# Patient Record
Sex: FEMALE | Race: WHITE | NOT HISPANIC OR LATINO | Employment: OTHER | URBAN - METROPOLITAN AREA
[De-identification: names, ages, dates, MRNs, and addresses within clinical notes are randomized per-mention and may not be internally consistent; named-entity substitution may affect disease eponyms.]

---

## 2023-03-23 ENCOUNTER — TELEPHONE (OUTPATIENT)
Dept: PRIMARY CARE | Facility: CLINIC | Age: 83
End: 2023-03-23
Payer: COMMERCIAL

## 2023-03-23 DIAGNOSIS — Z12.12 SCREENING FOR COLORECTAL CANCER: ICD-10-CM

## 2023-03-23 DIAGNOSIS — K59.00 CONSTIPATION, UNSPECIFIED CONSTIPATION TYPE: ICD-10-CM

## 2023-03-23 DIAGNOSIS — Z12.11 SCREENING FOR COLORECTAL CANCER: ICD-10-CM

## 2023-03-23 NOTE — TELEPHONE ENCOUNTER
I spoke to pt. She has been having constipation and darker stools for over a month. Denies any blood, fever or other symptoms and would like a referral to GI so she can get CRC screening and possibly discuss problems. I gave her there referral number to call and make an apt to see GI. Referral sent in chart per protocol per VO Dr. Nunez.

## 2023-03-23 NOTE — TELEPHONE ENCOUNTER
Patient has not had a colonoscopy in 9 years.   She is having trouble with think bowels and dark bowels.   Patient wants to know if she should see Dr. Nunez first?  Please advise.

## 2023-03-27 LAB
ERYTHROCYTE DISTRIBUTION WIDTH (RATIO) BY AUTOMATED COUNT: 13.5 % (ref 11.5–14.5)
ERYTHROCYTE MEAN CORPUSCULAR HEMOGLOBIN CONCENTRATION (G/DL) BY AUTOMATED: 32.8 G/DL (ref 32–36)
ERYTHROCYTE MEAN CORPUSCULAR VOLUME (FL) BY AUTOMATED COUNT: 96 FL (ref 80–100)
ERYTHROCYTES (10*6/UL) IN BLOOD BY AUTOMATED COUNT: 4.2 X10E12/L (ref 4–5.2)
HEMATOCRIT (%) IN BLOOD BY AUTOMATED COUNT: 40.5 % (ref 36–46)
HEMOGLOBIN (G/DL) IN BLOOD: 13.3 G/DL (ref 12–16)
LEUKOCYTES (10*3/UL) IN BLOOD BY AUTOMATED COUNT: 5.2 X10E9/L (ref 4.4–11.3)
PLATELETS (10*3/UL) IN BLOOD AUTOMATED COUNT: 257 X10E9/L (ref 150–450)

## 2023-03-28 LAB
C. DIFFICILE TOXIN, PCR: NORMAL
CLOSTRIDIUM DIFFICILE NAP 1 STRAIN (PRESUMPTIVE): NORMAL

## 2023-03-29 LAB
CAMPYLOBACTER GP: NOT DETECTED
NOROVIRUS GI/GII: NOT DETECTED
ROTAVIRUS A: NOT DETECTED
SALMONELLA SP.: NOT DETECTED
SHIGA TOXIN 1: NOT DETECTED
SHIGA TOXIN 2: NOT DETECTED
SHIGELLA SP.: NOT DETECTED
VIBRIO GRP.: NOT DETECTED
YERSINIA ENTEROCOLITICA: NOT DETECTED

## 2023-05-05 ENCOUNTER — HOSPITAL ENCOUNTER (OUTPATIENT)
Dept: DATA CONVERSION | Facility: HOSPITAL | Age: 83
End: 2023-05-05
Attending: STUDENT IN AN ORGANIZED HEALTH CARE EDUCATION/TRAINING PROGRAM | Admitting: STUDENT IN AN ORGANIZED HEALTH CARE EDUCATION/TRAINING PROGRAM
Payer: COMMERCIAL

## 2023-05-05 DIAGNOSIS — K31.9 DISEASE OF STOMACH AND DUODENUM, UNSPECIFIED: ICD-10-CM

## 2023-05-05 DIAGNOSIS — R19.7 DIARRHEA, UNSPECIFIED: ICD-10-CM

## 2023-05-05 DIAGNOSIS — R10.9 UNSPECIFIED ABDOMINAL PAIN: ICD-10-CM

## 2023-05-05 DIAGNOSIS — D12.4 BENIGN NEOPLASM OF DESCENDING COLON: ICD-10-CM

## 2023-05-05 DIAGNOSIS — R68.81 EARLY SATIETY: ICD-10-CM

## 2023-05-05 DIAGNOSIS — Z90.49 ACQUIRED ABSENCE OF OTHER SPECIFIED PARTS OF DIGESTIVE TRACT: ICD-10-CM

## 2023-05-05 DIAGNOSIS — K44.9 DIAPHRAGMATIC HERNIA WITHOUT OBSTRUCTION OR GANGRENE: ICD-10-CM

## 2023-05-05 LAB
ATRIAL RATE: 72 BPM
P AXIS: 44 DEGREES
P OFFSET: 202 MS
P ONSET: 154 MS
PR INTERVAL: 134 MS
Q ONSET: 221 MS
QRS COUNT: 12 BEATS
QRS DURATION: 80 MS
QT INTERVAL: 350 MS
QTC CALCULATION(BAZETT): 383 MS
QTC FREDERICIA: 372 MS
R AXIS: 20 DEGREES
T AXIS: 45 DEGREES
T OFFSET: 396 MS
VENTRICULAR RATE: 72 BPM

## 2023-05-09 LAB
COMPLETE PATHOLOGY REPORT: NORMAL
CONVERTED CLINICAL DIAGNOSIS-HISTORY: NORMAL
CONVERTED DIAGNOSIS COMMENT: NORMAL
CONVERTED FINAL DIAGNOSIS: NORMAL
CONVERTED FINAL REPORT PDF LINK TO COPY AND PASTE: NORMAL
CONVERTED SPECIMEN DESCRIPTION: NORMAL
FUNGAL CULTURE/SMEAR: NORMAL
FUNGAL SMEAR: NORMAL

## 2023-05-12 LAB
COMPLETE PATHOLOGY REPORT: NORMAL
CONVERTED CLINICAL DIAGNOSIS-HISTORY: NORMAL
CONVERTED FINAL DIAGNOSIS: NORMAL
CONVERTED FINAL REPORT PDF LINK TO COPY AND PASTE: NORMAL
CONVERTED GROSS DESCRIPTION: NORMAL

## 2023-06-12 PROBLEM — E55.9 VITAMIN D INSUFFICIENCY: Status: ACTIVE | Noted: 2023-06-12

## 2023-06-12 PROBLEM — E78.00 ELEVATED LDL CHOLESTEROL LEVEL: Status: ACTIVE | Noted: 2023-06-12

## 2023-06-20 ENCOUNTER — OFFICE VISIT (OUTPATIENT)
Dept: PRIMARY CARE | Facility: CLINIC | Age: 83
End: 2023-06-20
Payer: MEDICARE

## 2023-06-20 VITALS
RESPIRATION RATE: 18 BRPM | HEART RATE: 72 BPM | SYSTOLIC BLOOD PRESSURE: 124 MMHG | HEIGHT: 63 IN | WEIGHT: 118 LBS | DIASTOLIC BLOOD PRESSURE: 74 MMHG | BODY MASS INDEX: 20.91 KG/M2

## 2023-06-20 DIAGNOSIS — E03.9 HYPOTHYROIDISM, UNSPECIFIED TYPE: ICD-10-CM

## 2023-06-20 DIAGNOSIS — R47.89 WORD FINDING DIFFICULTY: ICD-10-CM

## 2023-06-20 DIAGNOSIS — F99 ABNORMAL MMSE: ICD-10-CM

## 2023-06-20 DIAGNOSIS — Z78.9 FULL CODE STATUS: ICD-10-CM

## 2023-06-20 DIAGNOSIS — Z00.00 ENCOUNTER FOR SUBSEQUENT ANNUAL WELLNESS VISIT (AWV) IN MEDICARE PATIENT: Primary | ICD-10-CM

## 2023-06-20 DIAGNOSIS — M81.6 LOCALIZED OSTEOPOROSIS WITHOUT PATHOLOGICAL FRACTURE: ICD-10-CM

## 2023-06-20 DIAGNOSIS — E78.00 ELEVATED LDL CHOLESTEROL LEVEL: ICD-10-CM

## 2023-06-20 DIAGNOSIS — Z12.31 VISIT FOR SCREENING MAMMOGRAM: ICD-10-CM

## 2023-06-20 DIAGNOSIS — Z51.81 MEDICATION MONITORING ENCOUNTER: ICD-10-CM

## 2023-06-20 DIAGNOSIS — N28.1 RENAL CYST: ICD-10-CM

## 2023-06-20 DIAGNOSIS — Z00.00 ENCOUNTER FOR ANNUAL PHYSICAL EXAM: ICD-10-CM

## 2023-06-20 DIAGNOSIS — E55.9 VITAMIN D DEFICIENCY: ICD-10-CM

## 2023-06-20 DIAGNOSIS — I65.29 CAROTID ATHEROSCLEROSIS, UNSPECIFIED LATERALITY: ICD-10-CM

## 2023-06-20 DIAGNOSIS — Z13.89 ENCOUNTER FOR SCREENING FOR OTHER DISORDER: ICD-10-CM

## 2023-06-20 DIAGNOSIS — Z78.0 MENOPAUSE: ICD-10-CM

## 2023-06-20 PROBLEM — R03.0 WHITE COAT SYNDROME WITHOUT HYPERTENSION: Status: RESOLVED | Noted: 2023-06-20 | Resolved: 2023-06-20

## 2023-06-20 PROBLEM — R19.4 BOWEL HABIT CHANGES: Status: RESOLVED | Noted: 2023-06-20 | Resolved: 2023-06-20

## 2023-06-20 PROBLEM — R31.29 MICROSCOPIC HEMATURIA: Status: RESOLVED | Noted: 2023-06-20 | Resolved: 2023-06-20

## 2023-06-20 PROBLEM — L98.9 SKIN LESION OF FACE: Status: RESOLVED | Noted: 2023-06-20 | Resolved: 2023-06-20

## 2023-06-20 PROBLEM — N13.30 HYDRONEPHROSIS, LEFT: Status: ACTIVE | Noted: 2023-06-20

## 2023-06-20 PROBLEM — B37.81 CANDIDA ESOPHAGITIS (MULTI): Status: RESOLVED | Noted: 2023-06-20 | Resolved: 2023-06-20

## 2023-06-20 PROBLEM — R68.81 EARLY SATIETY: Status: RESOLVED | Noted: 2023-06-20 | Resolved: 2023-06-20

## 2023-06-20 PROBLEM — R19.5 DARK STOOLS: Status: RESOLVED | Noted: 2023-06-20 | Resolved: 2023-06-20

## 2023-06-20 PROBLEM — R10.13 EPIGASTRIC PAIN: Status: RESOLVED | Noted: 2023-06-20 | Resolved: 2023-06-20

## 2023-06-20 PROBLEM — D72.819 LEUKOPENIA: Status: RESOLVED | Noted: 2023-06-20 | Resolved: 2023-06-20

## 2023-06-20 PROBLEM — M62.838 TRAPEZIUS MUSCLE SPASM: Status: RESOLVED | Noted: 2023-06-20 | Resolved: 2023-06-20

## 2023-06-20 PROBLEM — M85.852 OSTEOPENIA OF LEFT HIP: Status: ACTIVE | Noted: 2023-06-20

## 2023-06-20 PROBLEM — N60.01 CYST OF RIGHT BREAST: Status: RESOLVED | Noted: 2023-06-20 | Resolved: 2023-06-20

## 2023-06-20 PROCEDURE — 1123F ACP DISCUSS/DSCN MKR DOCD: CPT | Performed by: INTERNAL MEDICINE

## 2023-06-20 PROCEDURE — G0439 PPPS, SUBSEQ VISIT: HCPCS | Performed by: INTERNAL MEDICINE

## 2023-06-20 PROCEDURE — 1170F FXNL STATUS ASSESSED: CPT | Performed by: INTERNAL MEDICINE

## 2023-06-20 PROCEDURE — 1159F MED LIST DOCD IN RCRD: CPT | Performed by: INTERNAL MEDICINE

## 2023-06-20 PROCEDURE — 1160F RVW MEDS BY RX/DR IN RCRD: CPT | Performed by: INTERNAL MEDICINE

## 2023-06-20 PROCEDURE — 99214 OFFICE O/P EST MOD 30 MIN: CPT | Performed by: INTERNAL MEDICINE

## 2023-06-20 PROCEDURE — G0444 DEPRESSION SCREEN ANNUAL: HCPCS | Performed by: INTERNAL MEDICINE

## 2023-06-20 PROCEDURE — 1036F TOBACCO NON-USER: CPT | Performed by: INTERNAL MEDICINE

## 2023-06-20 RX ORDER — ASCORBIC ACID 500 MG
1 TABLET ORAL DAILY
COMMUNITY
Start: 2018-04-09

## 2023-06-20 RX ORDER — ACETAMINOPHEN 500 MG
50 TABLET ORAL DAILY
COMMUNITY
Start: 2022-01-12

## 2023-06-20 RX ORDER — CALCIUM CITRATE/VITAMIN D3 200MG-6.25
TABLET ORAL
COMMUNITY
Start: 2018-04-09

## 2023-06-20 RX ORDER — LANOLIN ALCOHOL/MO/W.PET/CERES
1 CREAM (GRAM) TOPICAL DAILY
COMMUNITY
Start: 2018-04-09

## 2023-06-20 SDOH — ECONOMIC STABILITY: FOOD INSECURITY: WITHIN THE PAST 12 MONTHS, THE FOOD YOU BOUGHT JUST DIDN'T LAST AND YOU DIDN'T HAVE MONEY TO GET MORE.: NEVER TRUE

## 2023-06-20 SDOH — ECONOMIC STABILITY: HOUSING INSECURITY: IN THE LAST 12 MONTHS, HOW MANY PLACES HAVE YOU LIVED?: 1

## 2023-06-20 SDOH — ECONOMIC STABILITY: INCOME INSECURITY: IN THE LAST 12 MONTHS, WAS THERE A TIME WHEN YOU WERE NOT ABLE TO PAY THE MORTGAGE OR RENT ON TIME?: NO

## 2023-06-20 SDOH — ECONOMIC STABILITY: HOUSING INSECURITY
IN THE LAST 12 MONTHS, WAS THERE A TIME WHEN YOU DID NOT HAVE A STEADY PLACE TO SLEEP OR SLEPT IN A SHELTER (INCLUDING NOW)?: NO

## 2023-06-20 SDOH — HEALTH STABILITY: PHYSICAL HEALTH: ON AVERAGE, HOW MANY MINUTES DO YOU ENGAGE IN EXERCISE AT THIS LEVEL?: 60 MIN

## 2023-06-20 SDOH — ECONOMIC STABILITY: FOOD INSECURITY: WITHIN THE PAST 12 MONTHS, YOU WORRIED THAT YOUR FOOD WOULD RUN OUT BEFORE YOU GOT MONEY TO BUY MORE.: NEVER TRUE

## 2023-06-20 SDOH — HEALTH STABILITY: PHYSICAL HEALTH: ON AVERAGE, HOW MANY DAYS PER WEEK DO YOU ENGAGE IN MODERATE TO STRENUOUS EXERCISE (LIKE A BRISK WALK)?: 7 DAYS

## 2023-06-20 SDOH — ECONOMIC STABILITY: TRANSPORTATION INSECURITY
IN THE PAST 12 MONTHS, HAS LACK OF TRANSPORTATION KEPT YOU FROM MEETINGS, WORK, OR FROM GETTING THINGS NEEDED FOR DAILY LIVING?: NO

## 2023-06-20 SDOH — ECONOMIC STABILITY: TRANSPORTATION INSECURITY
IN THE PAST 12 MONTHS, HAS THE LACK OF TRANSPORTATION KEPT YOU FROM MEDICAL APPOINTMENTS OR FROM GETTING MEDICATIONS?: NO

## 2023-06-20 ASSESSMENT — SOCIAL DETERMINANTS OF HEALTH (SDOH)
HOW OFTEN DO YOU ATTEND CHURCH OR RELIGIOUS SERVICES?: NEVER
HOW OFTEN DO YOU GET TOGETHER WITH FRIENDS OR RELATIVES?: MORE THAN THREE TIMES A WEEK
WITHIN THE LAST YEAR, HAVE YOU BEEN AFRAID OF YOUR PARTNER OR EX-PARTNER?: NO
WITHIN THE LAST YEAR, HAVE TO BEEN RAPED OR FORCED TO HAVE ANY KIND OF SEXUAL ACTIVITY BY YOUR PARTNER OR EX-PARTNER?: NO
WITHIN THE LAST YEAR, HAVE YOU BEEN KICKED, HIT, SLAPPED, OR OTHERWISE PHYSICALLY HURT BY YOUR PARTNER OR EX-PARTNER?: NO
IN A TYPICAL WEEK, HOW MANY TIMES DO YOU TALK ON THE PHONE WITH FAMILY, FRIENDS, OR NEIGHBORS?: MORE THAN THREE TIMES A WEEK
IN THE PAST 12 MONTHS, HAS THE ELECTRIC, GAS, OIL, OR WATER COMPANY THREATENED TO SHUT OFF SERVICE IN YOUR HOME?: NO
HOW HARD IS IT FOR YOU TO PAY FOR THE VERY BASICS LIKE FOOD, HOUSING, MEDICAL CARE, AND HEATING?: NOT HARD AT ALL
DO YOU BELONG TO ANY CLUBS OR ORGANIZATIONS SUCH AS CHURCH GROUPS UNIONS, FRATERNAL OR ATHLETIC GROUPS, OR SCHOOL GROUPS?: NO
WITHIN THE LAST YEAR, HAVE YOU BEEN HUMILIATED OR EMOTIONALLY ABUSED IN OTHER WAYS BY YOUR PARTNER OR EX-PARTNER?: NO
HOW OFTEN DO YOU ATTENT MEETINGS OF THE CLUB OR ORGANIZATION YOU BELONG TO?: NEVER

## 2023-06-20 ASSESSMENT — ANXIETY QUESTIONNAIRES
4. TROUBLE RELAXING: NOT AT ALL
GAD7 TOTAL SCORE: 0
IF YOU CHECKED OFF ANY PROBLEMS ON THIS QUESTIONNAIRE, HOW DIFFICULT HAVE THESE PROBLEMS MADE IT FOR YOU TO DO YOUR WORK, TAKE CARE OF THINGS AT HOME, OR GET ALONG WITH OTHER PEOPLE: NOT DIFFICULT AT ALL
1. FEELING NERVOUS, ANXIOUS, OR ON EDGE: NOT AT ALL
3. WORRYING TOO MUCH ABOUT DIFFERENT THINGS: NOT AT ALL
6. BECOMING EASILY ANNOYED OR IRRITABLE: NOT AT ALL
2. NOT BEING ABLE TO STOP OR CONTROL WORRYING: NOT AT ALL
7. FEELING AFRAID AS IF SOMETHING AWFUL MIGHT HAPPEN: NOT AT ALL
5. BEING SO RESTLESS THAT IT IS HARD TO SIT STILL: NOT AT ALL

## 2023-06-20 ASSESSMENT — PATIENT HEALTH QUESTIONNAIRE - PHQ9
SUM OF ALL RESPONSES TO PHQ9 QUESTIONS 1 AND 2: 0
1. LITTLE INTEREST OR PLEASURE IN DOING THINGS: NOT AT ALL
2. FEELING DOWN, DEPRESSED OR HOPELESS: NOT AT ALL
2. FEELING DOWN, DEPRESSED OR HOPELESS: NOT AT ALL
SUM OF ALL RESPONSES TO PHQ9 QUESTIONS 1 AND 2: 0
2. FEELING DOWN, DEPRESSED OR HOPELESS: NOT AT ALL
1. LITTLE INTEREST OR PLEASURE IN DOING THINGS: NOT AT ALL
1. LITTLE INTEREST OR PLEASURE IN DOING THINGS: NOT AT ALL
SUM OF ALL RESPONSES TO PHQ9 QUESTIONS 1 & 2: 0

## 2023-06-20 ASSESSMENT — ACTIVITIES OF DAILY LIVING (ADL)
JUDGMENT_ADEQUATE_SAFELY_COMPLETE_DAILY_ACTIVITIES: YES
GROCERY_SHOPPING: INDEPENDENT
WALKS IN HOME: INDEPENDENT
STIL DRIVING: YES
TAKING_MEDICATION: INDEPENDENT
MANAGING_FINANCES: INDEPENDENT
PILL BOX USED: NO
ADEQUATE_TO_COMPLETE_ADL: YES
NEEDS ASSISTANCE WITH FOOD: INDEPENDENT
USING TRANSPORTATION: INDEPENDENT
HEARING - RIGHT EAR: HEARING AID
GROOMING: INDEPENDENT
EATING: INDEPENDENT
DRESSING YOURSELF: INDEPENDENT
BATHING: INDEPENDENT
HEARING - LEFT EAR: HEARING AID
DOING_HOUSEWORK: INDEPENDENT
TAKING MEDICATION: INDEPENDENT
PATIENT'S MEMORY ADEQUATE TO SAFELY COMPLETE DAILY ACTIVITIES?: YES
TOILETING: INDEPENDENT
MANAGING FINANCES: INDEPENDENT
FEEDING YOURSELF: INDEPENDENT
BATHING: INDEPENDENT
DRESSING: INDEPENDENT

## 2023-06-20 ASSESSMENT — PAIN SCALES - GENERAL: PAINLEVEL: 0-NO PAIN

## 2023-06-20 ASSESSMENT — LIFESTYLE VARIABLES
SKIP TO QUESTIONS 9-10: 1
AUDIT-C TOTAL SCORE: 0
HOW OFTEN DO YOU HAVE SIX OR MORE DRINKS ON ONE OCCASION: NEVER
HOW OFTEN DO YOU HAVE A DRINK CONTAINING ALCOHOL: NEVER
HOW MANY STANDARD DRINKS CONTAINING ALCOHOL DO YOU HAVE ON A TYPICAL DAY: PATIENT DOES NOT DRINK

## 2023-06-20 ASSESSMENT — COLUMBIA-SUICIDE SEVERITY RATING SCALE - C-SSRS
1. IN THE PAST MONTH, HAVE YOU WISHED YOU WERE DEAD OR WISHED YOU COULD GO TO SLEEP AND NOT WAKE UP?: NO
6. HAVE YOU EVER DONE ANYTHING, STARTED TO DO ANYTHING, OR PREPARED TO DO ANYTHING TO END YOUR LIFE?: NO
2. HAVE YOU ACTUALLY HAD ANY THOUGHTS OF KILLING YOURSELF?: NO

## 2023-06-20 ASSESSMENT — ENCOUNTER SYMPTOMS
LOSS OF SENSATION IN FEET: 0
DEPRESSION: 0
OCCASIONAL FEELINGS OF UNSTEADINESS: 0

## 2023-06-20 NOTE — PROGRESS NOTES
"Subjective   Reason for Visit: Ange Delaney is an 83 y.o. female here for a Medicare Wellness visit.  And general follow up.    Past Medical, Surgical, and Family History reviewed and updated in chart.    HPI  Here for annual (GA) and wellness visit.  No physical symptoms   Had renal ul with 6cm cyst in past and microhematuria and left hydropnephrosis in 2020, will harvey ultrasound /s  Had candida esophagitis spring, on egd and treated -had dysphagia pre and now that is gone, \"it was from eating chocolate\"  Treated 10 days of oral fluconazole. Had video visit to review finding with dr dedrick deng  Colonoscopy may 2023 tubular adenoma    Full code    She is sometimes not able to find words x 6 onths  No other memory issues  Dtr noted it too  No anxiety or depresson, word eventually gets there.  No fmh neurologic issue  She talks to people during the week  She does card games on the computer  She likes puzzles  Busy with grandkids  Not a lot of word games  No focal weakness speech issues or head trauma.    Health maintenance items last completed:  Colonoscopy: may 2023  Mammogram: 6/29/22 d/ her usptf rec , she is healthy , recommend she continue mammograms. Ordered. She agrees.  Bone Density: , ordered  Urine albumin if HTN or DM2:   Breast exam in office:  Vaccines due or not completed: consider omicron containing covid booster, her last was 4-2022.    Discussed current recommendations for covid vaccines, cases are currently low but not non-existent.        Last Health Maintenance exam:      Patient Care Team:  Jadyn Nunez MD as PCP - General  Jadyn Nunez MD as PCP - [facility] Attributed Provider     Review of Systems  All systems reviewed and are negative unless otherwise stated in HPI.   Review of systems, written document, scanned in to office visit.  I reviewed 7 page history and review of systems form.    Objective   Vitals:  /74 (BP Location: Left arm, Patient Position: " "Sitting, BP Cuff Size: Adult)   Pulse 72   Resp 18   Ht 1.6 m (5' 3\")   Wt 53.5 kg (118 lb)   BMI 20.90 kg/m²       Physical Exam  General: Patient is known to me, looks well, is in usual state of health, with  no obvious distress.  Head: normocephalic  Eyes: PERRL, EOMI, no scleral icterus or conjunctival abnormality  Ears:Normal canals and TM's  Oropharynx: Well hydrated mucosa. no lesions, erythema. palate moves well.  Neck: No masses, no cervical (A/P/ or Lateral) adenopathy. Thyroid not enlarged and no nodules. Carotid pulses normal and no bruits.  Chest: Normal AP diameter. No supraclavicular adenopathy.  Lungs: Clear to auscultation: no rales , wheezes, rhonchi, rubs, examined bilaterally, ant/post /lateral. RR normal.  Heart: RRR. No MGCR S1 S2 normal.  Abd: BS normal, no bruits. No hepatosplenomegaly. No abdominal mass or tenderness. No distension.  Extremities: No upper extremity edema. No lower leg edema.No ischemia.   Joints: no synovitis seen. No deformities.  Pulses: normal posterior tibialis pulses, normal dorsalis pedis pulses. normal radial pulses. Normal femoral pulses without bruits.  Neuro: CN 2-12 intact . Alert, oriented, appropriate.  No focal weakness observed. No tremors. Ambulation is normal .  Psych: Mood and affect normal. No cognitive deficits noted during this exam. Alert, oriented, appropriate.  Skin: No rash, petechiae or excessive bruising.  Lymph: no SC axillary or inguinal adenopathy     Breast Exam : Symmetric appearance. No masses, nipple discharge, skin retraction, axillary or supraclavicular adenopathy.  MMSE 28/30- missed the backwords spelling of world 2 pts--did not want to try the math.    Assessment/Plan   Problem List Items Addressed This Visit       Elevated LDL cholesterol level    Relevant Orders    Lipid Panel    TSH with reflex to Free T4 if abnormal    Comprehensive Metabolic Panel    CT head wo IV contrast    Carotid atherosclerosis    Relevant Orders    CT " head wo IV contrast    Localized osteoporosis without pathological fracture    Relevant Orders    XR DEXA bone density    Renal cyst    Relevant Orders    Urinalysis with Reflex Microscopic    US renal complete     Other Visit Diagnoses       Encounter for subsequent annual wellness visit (AWV) in Medicare patient    -  Primary    Vitamin D deficiency        Relevant Orders    Vitamin D, Total    Hypothyroidism, unspecified type        Relevant Orders    Vitamin B12    Vitamin B1, Whole Blood    Medication monitoring encounter        Encounter for screening for other disorder        Full code status        Visit for screening mammogram        Relevant Orders    BI mammo bilateral screening tomosynthesis    Menopause        Relevant Orders    XR DEXA bone density    Word finding difficulty        Relevant Orders    CT head wo IV contrast    Vitamin B12    Vitamin B1, Whole Blood    Encounter for annual physical exam            Cbw ordered as well as above.  Medicare wellness visit  completed including:  Immunizations,   Health Maintenance items,  Discussion of advanced directives and code status, which is: full code  Fall risk and prevention addressed.  Functionality and pain were assessed.   Cancer screening testing was addressed as appropriate-see patient discussion/orders.   Medications were discussed and reviewed/reconciled and refill need assessed/handled.   Patient states taking their medication regularly and appropriately.  Also:   Depression screening PhQ-2 completed: neg  Alcohol misuse screen:neg    In addition to the wellness visit and screening, the above medical issues were addressed:  As well as Results of testing completed since last visit.

## 2023-06-20 NOTE — PATIENT INSTRUCTIONS
Thank you for coming in for your annual check up and wellness visit.  Please obtain fasting blood tests and a urine test at the lab.   Instructions for fasting blood work.   Please do not consume calories for 10-12 hours prior to this blood test. It is ok to drink water, you should be hydrated.  Please do not take vitamins, supplements or thyroid medication before your blood is drawn this day.   Most lab  test results should be available for your review on the  My Chart portal within 48 hours.    Please schedule your mammogram for after June 29th,  Schedule ultrasound of the kidneys.  Schedule CT brain no contrast due to word finding issues.    End of October : bone density is due.  Vaccines:   Recommend covid vaccine this summer as your last booster was over a year ago: April 2022.    Flu shot in the fall.    Add games or exercises that involve words: crossword puzzles, word search and want you to write these, not on the computer. Also doing basic arithmetic is good for memory.    Follow up November after bone density and check on word finding issue.    Annual in one year.  Follow up one year annual, sooner depends on results of tests or any new issues.

## 2023-08-02 DIAGNOSIS — N28.1 RENAL CYST: ICD-10-CM

## 2023-08-02 DIAGNOSIS — N13.30 HYDRONEPHROSIS, LEFT: Primary | ICD-10-CM

## 2023-08-03 NOTE — RESULT ENCOUNTER NOTE
Please call the patient regarding her result.  Refer urology re hydronephrosis mild. And renal cysts Seen on ultrasound.Refer to Dr Monzon or if will not leave [place], dr cota. Let me know.    Also head ct-shows some hardening of the arteries, a little volume  loss. Age related findings

## 2023-08-04 LAB — URINE CULTURE: NO GROWTH

## 2023-08-12 DIAGNOSIS — Z12.31 ENCOUNTER FOR SCREENING MAMMOGRAM FOR MALIGNANT NEOPLASM OF BREAST: Primary | ICD-10-CM

## 2023-08-12 NOTE — RESULT ENCOUNTER NOTE
Please call the patient regarding her mammogram result.  They did not see anything bad on her mammogram but her positioning was not right for one of the views and she needs to go back to get another image. No charge to her and they said to say sorry about this, but they want the best pictures to be able to truly say the mammogram was ok. The image needed is on the right.

## 2023-09-07 VITALS
HEART RATE: 70 BPM | DIASTOLIC BLOOD PRESSURE: 70 MMHG | SYSTOLIC BLOOD PRESSURE: 153 MMHG | TEMPERATURE: 97.2 F | RESPIRATION RATE: 18 BRPM

## 2023-09-11 ENCOUNTER — LAB (OUTPATIENT)
Dept: LAB | Facility: LAB | Age: 83
End: 2023-09-11
Payer: MEDICARE

## 2023-09-11 DIAGNOSIS — E03.9 HYPOTHYROIDISM, UNSPECIFIED TYPE: ICD-10-CM

## 2023-09-11 DIAGNOSIS — E55.9 VITAMIN D DEFICIENCY: ICD-10-CM

## 2023-09-11 DIAGNOSIS — E78.00 ELEVATED LDL CHOLESTEROL LEVEL: ICD-10-CM

## 2023-09-11 DIAGNOSIS — N28.1 RENAL CYST: ICD-10-CM

## 2023-09-11 DIAGNOSIS — R47.89 WORD FINDING DIFFICULTY: ICD-10-CM

## 2023-09-11 LAB
ALANINE AMINOTRANSFERASE (SGPT) (U/L) IN SER/PLAS: 9 U/L (ref 7–45)
ALBUMIN (G/DL) IN SER/PLAS: 4.3 G/DL (ref 3.4–5)
ALKALINE PHOSPHATASE (U/L) IN SER/PLAS: 43 U/L (ref 33–136)
ANION GAP IN SER/PLAS: 15 MMOL/L (ref 10–20)
APPEARANCE, URINE: NORMAL
ASPARTATE AMINOTRANSFERASE (SGOT) (U/L) IN SER/PLAS: 19 U/L (ref 9–39)
BILIRUBIN TOTAL (MG/DL) IN SER/PLAS: 0.8 MG/DL (ref 0–1.2)
BILIRUBIN, URINE: NEGATIVE
BLOOD, URINE: NEGATIVE
CALCIDIOL (25 OH VITAMIN D3) (NG/ML) IN SER/PLAS: 53 NG/ML
CALCIUM (MG/DL) IN SER/PLAS: 9.8 MG/DL (ref 8.6–10.6)
CARBON DIOXIDE, TOTAL (MMOL/L) IN SER/PLAS: 27 MMOL/L (ref 21–32)
CHLORIDE (MMOL/L) IN SER/PLAS: 102 MMOL/L (ref 98–107)
CHOLESTEROL (MG/DL) IN SER/PLAS: 227 MG/DL (ref 0–199)
CHOLESTEROL IN HDL (MG/DL) IN SER/PLAS: 60.2 MG/DL
CHOLESTEROL/HDL RATIO: 3.8
COBALAMIN (VITAMIN B12) (PG/ML) IN SER/PLAS: 332 PG/ML (ref 211–911)
COLOR, URINE: YELLOW
CREATININE (MG/DL) IN SER/PLAS: 0.82 MG/DL (ref 0.5–1.05)
GFR FEMALE: 71 ML/MIN/1.73M2
GLUCOSE (MG/DL) IN SER/PLAS: 103 MG/DL (ref 74–99)
GLUCOSE, URINE: NEGATIVE MG/DL
KETONES, URINE: NEGATIVE MG/DL
LDL: 144 MG/DL (ref 0–99)
LEUKOCYTE ESTERASE, URINE: NEGATIVE
NITRITE, URINE: NEGATIVE
PH, URINE: 6 (ref 5–8)
POTASSIUM (MMOL/L) IN SER/PLAS: 4.3 MMOL/L (ref 3.5–5.3)
PROTEIN TOTAL: 7.2 G/DL (ref 6.4–8.2)
PROTEIN, URINE: NEGATIVE MG/DL
SODIUM (MMOL/L) IN SER/PLAS: 140 MMOL/L (ref 136–145)
SPECIFIC GRAVITY, URINE: 1.02 (ref 1–1.03)
THYROTROPIN (MIU/L) IN SER/PLAS BY DETECTION LIMIT <= 0.05 MIU/L: 3.8 MIU/L (ref 0.44–3.98)
TRIGLYCERIDE (MG/DL) IN SER/PLAS: 116 MG/DL (ref 0–149)
UREA NITROGEN (MG/DL) IN SER/PLAS: 21 MG/DL (ref 6–23)
UROBILINOGEN, URINE: <2 MG/DL (ref 0–1.9)
VLDL: 23 MG/DL (ref 0–40)

## 2023-09-11 PROCEDURE — 36415 COLL VENOUS BLD VENIPUNCTURE: CPT

## 2023-09-11 PROCEDURE — 84425 ASSAY OF VITAMIN B-1: CPT

## 2023-09-11 PROCEDURE — 82306 VITAMIN D 25 HYDROXY: CPT

## 2023-09-11 PROCEDURE — 81003 URINALYSIS AUTO W/O SCOPE: CPT

## 2023-09-11 PROCEDURE — 82607 VITAMIN B-12: CPT

## 2023-09-11 PROCEDURE — 80061 LIPID PANEL: CPT

## 2023-09-11 PROCEDURE — 84443 ASSAY THYROID STIM HORMONE: CPT

## 2023-09-11 PROCEDURE — 80053 COMPREHEN METABOLIC PANEL: CPT

## 2023-09-11 NOTE — RESULT ENCOUNTER NOTE
Please call the patient regarding her result.  Her B12 is too low. Is she taking a B12 supplement?  If no, recommend 250 mcg one tab daily long term.  It is important to keep B12 levels over 400 for brain and cognitive function.  Can send it in as an rx if preferred.  Vitamin d is a little low at 29. Is she taking her supplement regularly? I is to be taking 2000 international units daily.,     Cholesterol mildly elevated but stable.Can discuss further at her November visit

## 2023-09-14 NOTE — H&P
History of Present Illness:   History Present Illness:  Reason for surgery: Abdominal pain, early satiety,  diarrhea, dark stools.   HPI:    Abdominal pain, early satiety, diarrhea, dark stools --> EGD and colonoscopy.     Allergies:        Allergies:  ·  doxycycline : Unknown  ·  Fosamax : Unknown  ·  Vytorin : Unknown    Home Medication Review:   Home Medications Reviewed: yes     Impression/Procedure:   ·  Impression and Planned Procedure: Abdominal pain, early satiety, diarrhea, dark stools --> EGD and colonoscopy.       ERAS (Enhanced Recovery After Surgery):  ·  ERAS Patient: no       Vital Signs:  Temperature C: 36.2 degrees C   Temperature F: 97.1 degrees F   Heart Rate: 70 beats per minute   Respiratory Rate: 18 breath per minute   Blood Pressure Systolic: 153 mm/Hg   Blood Pressure Diastolic: 70 mm/Hg     Physical Exam by System:    Constitutional: A&Ox3. NAD   Head/Neck: AT/NC   Respiratory/Thorax: CTA bilat. No wheezing.   Cardiovascular: RRR. No murmur.   Gastrointestinal: Soft, NT/ND.   Extremities: No edema.   Neurological: No focal deficits   Psychological: Normal mood and affect.     Consent:   COVID-19 Consent:  ·  COVID-19 Risk Consent Surgeon has reviewed key risks related to the risk of emily COVID-19 and if they contract COVID-19 what the risks are.       Electronic Signatures:  Janak Pascual)  (Signed 05-May-2023 10:21)   Authored: History of Present Illness, Allergies, Home  Medication Review, Impression/Procedure, ERAS, Physical Exam, Consent, Note Completion      Last Updated: 05-May-2023 10:21 by Janak Pascual ()

## 2023-09-15 LAB — VITAMIN B1, WHOLE BLOOD: 122 NMOL/L (ref 70–180)

## 2023-09-26 ENCOUNTER — TELEPHONE (OUTPATIENT)
Dept: PRIMARY CARE | Facility: CLINIC | Age: 83
End: 2023-09-26
Payer: COMMERCIAL

## 2023-09-26 NOTE — TELEPHONE ENCOUNTER
Pts daughter called and is checking on [facility] papers for her mom. She is having cataract surgery next month and she will need to take her to these appts. Her mom is no longer to go to appts by herself.

## 2023-09-27 NOTE — TELEPHONE ENCOUNTER
Pt daughter Gardenia called to check on status of forms for FMLA. She is aware [employer] has forms and will fill them out and we will contact her when they are completed.

## 2023-10-19 ENCOUNTER — TELEPHONE (OUTPATIENT)
Dept: UROLOGY | Facility: HOSPITAL | Age: 83
End: 2023-10-19

## 2023-10-30 ENCOUNTER — APPOINTMENT (OUTPATIENT)
Dept: RADIOLOGY | Facility: CLINIC | Age: 83
End: 2023-10-30
Payer: MEDICARE

## 2023-11-04 NOTE — PROGRESS NOTES
Subjective   Patient ID: Ange Delaney is a 83 y.o. female who presents for Follow-up (Pt here for follow up and review. Daughter did not come with her today, she only comes when pt is driving somewhere she doesn't know or having trouble with direction. Pt completed the MMSE with this nurse today.).  LAST VISIT PLAN 6/20/23  Problem List Items Addressed This Visit         Elevated LDL cholesterol level     Relevant Orders     Lipid Panel     TSH with reflex to Free T4 if abnormal     Comprehensive Metabolic Panel     CT head wo IV contrast     Carotid atherosclerosis     Relevant Orders     CT head wo IV contrast     Localized osteoporosis without pathological fracture     Relevant Orders     XR DEXA bone density     Renal cyst     Relevant Orders     Urinalysis with Reflex Microscopic     US renal complete      Other Visit Diagnoses         Encounter for subsequent annual wellness visit (AWV) in Medicare patient    -  Primary     Vitamin D deficiency         Relevant Orders     Vitamin D, Total     Hypothyroidism, unspecified type         Relevant Orders     Vitamin B12     Vitamin B1, Whole Blood     Medication monitoring encounter         Encounter for screening for other disorder         Full code status         Visit for screening mammogram         Relevant Orders     BI mammo bilateral screening tomosynthesis     Menopause         Relevant Orders     XR DEXA bone density     Word finding difficulty         Relevant Orders     CT head wo IV contrast     Vitamin B12     Vitamin B1, Whole Blood     Encounter for annual physical exam              Cbw ordered as well as above.  Medicare wellness visit  completed including:  Immunizations,   Health Maintenance items,  Discussion of advanced directives and code status, which is: full code  Fall risk and prevention addressed.  Functionality and pain were assessed.   Cancer screening testing was addressed as appropriate-see patient discussion/orders.   Medications were  "discussed and reviewed/reconciled and refill need assessed/handled.   Patient states taking their medication regularly and appropriately.  Also:   Depression screening PhQ-2 completed: neg  Alcohol misuse screen:neg     In addition to the wellness visit and screening, the above medical issues were addressed:  As well as Results of testing completed since last visit.    END INFO FROM PRIOR VISIT  HPI  Here for follow up as planned.  Has seen dr willingham urology in the meantime for renal cyst: september note reviewed. Rajiv 2 cyst that had decreased in size.  He said f/up  1 year and refilled her vag estrogen that helps her bladder issues..  Had renal ultrasound that prompted that visit  === 06/20/23 ===    US RENAL COMPLETE    - Impression -  Mild left-sided hydronephrosis.    Multiple left-sided renal cysts.    Increased renal cortical echogenicity suggestive of medical renal  disease.    Right-sided pelviectasis without ira hydronephrosis.   -------------------------------------------------------    Other tests: Had ct brain, dexa and labs since last visit.  See results below.  She was having word finding issues and concern for memory.we suggested games puzzles arithmetic to do to improve.  She is doing these and feels it is helping and she is having les sissues.  Dtr requested la to bring mom to visits since last visit.    We had  called her about labs. She has osteoporosis and impt for her vit d to be normal.  \"Her B12 is too low. Is she taking a B12 supplement?  If no, recommend 250 mcg one tab daily long term.  It is important to keep B12 levels over 400 for brain and cognitive function.  Can send it in as an rx if preferred.  We told her Vitamin d is a little low at 29. But that was 2 years ago and the new one is fine.she is taking it regulary. She added b12, not sure of the dose, and did not take it yet today so will harvey level.     Cholesterol mildly elevated but /ldl is 144. She has atheroscorosis carotid " carmita, suggest adding statin to decrease risk of stroke.     Was to have additional mammogram views and we contacted her August 12 about this,could not find report. She said she had it done. Looked in old [employer] system and found the mammogram follow up done 8-28-23 and all ok. Now just still needs the bone density.she has no breast complaints    Review of Systems    No cp palpitation  No sob cough wheezing  Doing puzzles daily and word search and memory is better.  30/30 on mmse  Current Outpatient Medications   Medication Instructions    ascorbic acid (Vitamin C) 500 mg tablet 1 tablet, oral, Daily    chencho cit-mag-D3-Zn--man-bor (Citracal-D3 Plus Magnesium) 250- mg-mg-unit tablet oral    cholecalciferol (VITAMIN D-3) 50 mcg, oral, Daily    cyanocobalamin (Vitamin B-12) 1,000 mcg tablet 1 tablet, oral, Daily    estradiol (ESTRACE) 2 g, vaginal, Daily    fish oil concentrate (Omega-3) 120-180 mg capsule 1 g, oral, Daily    pravastatin (PRAVACHOL) 20 mg, oral, Daily     Allergies   Allergen Reactions    Alendronate Unknown    Doxycycline Unknown    Ezetimibe-Simvastatin Myalgia     Objective   Lab Results   Component Value Date    WBC 5.2 03/27/2023    HGB 13.3 03/27/2023    HCT 40.5 03/27/2023     03/27/2023    CHOL 227 (H) 09/11/2023    TRIG 116 09/11/2023    HDL 60.2 09/11/2023    ALT 9 09/11/2023    AST 19 09/11/2023     09/11/2023    K 4.3 09/11/2023     09/11/2023    CREATININE 0.82 09/11/2023    BUN 21 09/11/2023    CO2 27 09/11/2023    TSH 3.80 09/11/2023   === 09/14/23 ===    CT UROGRAPHY WITH 3D VOLUME RENDERED IMAGING    - Impression -  Scattered benign renal cysts. Dominant Bosniak II cyst in the left  kidney has slightly decreased in size compared to 2020.  No suspicious upper tract lesion or hydronephrosis.  No urinary tract calculus.  -------------------  July 2023 CT head:  Atherosclerotic calcifications are noted along the carotid siphons.     There is partial  "opacification of the left sphenoid sinus. The  remaining visualized paranasal sinuses and mastoid air cells are  clear.  No acute fracture is noted.  IMPRESSION:  There is mild-to-moderate brain parenchymal volume loss.  There are mild nonspecific white matter changes noted within cerebral  hemispheres bilaterally which while nonspecific, given the patient's  age, likely represent sequelae of small-vessel ischemic change.  -------------------------------   Mammo 7/31/23 ok but was to have extra view at no charge as they had an issue with not seeing well on that view. I do not see it was done, they sent her a letter. We had ordered extra imaging and ultrasound in August.  ---------------  Bone density not done.    Physical ExamBP 124/72 (BP Location: Left arm, Patient Position: Sitting, BP Cuff Size: Adult)   Pulse 64   Resp 18   Ht 1.6 m (5' 3\")   Wt 53.5 kg (118 lb)   BMI 20.90 kg/m²   Patient looks well and is in no obvious distress.  HEENT:   Normocephalic, no facial asymmetry  Eyes: Sclera and conjunctiva are clear.  Neck: No adenopathy cervical (Ant/post/lat), no Supraclavicular nodes.   Thyroid normal.   Carotid pulses normal, no carotid bruits.  Lungs : RR normal. Clear to auscultation anterior, posterior and lateral. No rales, wheezes rhonchi or rubs. Good air exchange.  Heart: RRR. No Murmur, gallop, click or rub.  Abdomen: Bowel sounds normal, No bruits. No pulsatile mass. No hepatosplenomegaly, masses or tenderness. Soft, no guarding.    Extremities: no upper extremity edema. No lower extremity edema.   Musculoskeletal: no synovitis of joints seen. No new deformity.  Neuro: CN 2-12 intact. Alert, appropriate.   Ambulates independently.  No gross motor deficit.   No tremors.  Psych: normal mood and affect.  MMSE 30/30  Skin: No rash, bruising petechiae or jaundice.    Ange was seen today for follow-up.  Diagnoses and all orders for this visit:  Vitamin D deficiency (Primary)  Elevated LDL cholesterol " level  -     pravastatin (Pravachol) 20 mg tablet; Take 1 tablet (20 mg) by mouth once daily.  -     Lipid Panel; Future  -     Aspartate Aminotransferase; Future  -     Alanine Aminotransferase; Future  -     CK; Future  Localized osteoporosis without pathological fracture  Low serum vitamin B12  -     Vitamin B12; Future  Memory change  Renal cyst  Comments:  micheal 2, decr in size 2020 to 2023, sees dr willingham, follow up sept 2024  Need for influenza vaccination  -     Flu vaccine, quadrivalent, high-dose, preservative free, age 65y+ (FLUZONE)  Encounter for hepatitis C screening test for low risk patient  -     Hepatitis C Antibody; Future  Mild atherosclerosis of carotid artery, unspecified laterality  -     pravastatin (Pravachol) 20 mg tablet; Take 1 tablet (20 mg) by mouth once daily.  Carotid atherosclerosis, unspecified laterality  Vitamin D insufficiency  Word finding difficulty  Comments:  improved  History of abnormal mammogram  Recurrent UTI  Better on vag Estrogen per uro. Hydronphroiss resolved  Vaccine counseling done.  She is doing well.   Osteoporosis-She is not on prescription meds for her op , she has improved her vitamind intake and is exercising, needs current dexa.   Breast issue on mammogram resolved on August testing.  Cholesterol not at goal, has carotid plaque : will start pravastatin with goal of ldl below 70 and labs will harvey on the pravastatin  Flu shot today, screen hep c future labs  Renal cyst, on track with follow up with uro    See wrap up section for patient instructions and  additional treatment plan.     Blood test today to check on B12 level: let us know what dose you are taking.    Your vitamin d level was normal fyi, the info we called you about stating it was low at 29 was an old value, and not a correct statement.    Add pravastatin 20 mg once per day after supper or at bedtime.  This is to reduce cholesterol with goal of LDL cholesterol close to or below 70 (it is  currently 144).  I t is also to reduce/stabilize plaque and reduce cardio and cerebrovascular risk.  Call if any issues. Pravastatin is less likely to cause aching as you had with vytorin in the past.    Fasting blood test on this medication in 3 months.    Please schedule your bone density closer to your 3-4 month follow up visit. And get the fasting test to check on pravastatin the same day.    You had your flu shot today.  Recommend updated covid booster.    Appt at 3-4 months.

## 2023-11-06 ENCOUNTER — LAB (OUTPATIENT)
Dept: LAB | Facility: LAB | Age: 83
End: 2023-11-06
Payer: MEDICARE

## 2023-11-06 ENCOUNTER — OFFICE VISIT (OUTPATIENT)
Dept: PRIMARY CARE | Facility: CLINIC | Age: 83
End: 2023-11-06
Payer: MEDICARE

## 2023-11-06 VITALS
WEIGHT: 118 LBS | HEART RATE: 64 BPM | DIASTOLIC BLOOD PRESSURE: 72 MMHG | SYSTOLIC BLOOD PRESSURE: 124 MMHG | RESPIRATION RATE: 18 BRPM | HEIGHT: 63 IN | BODY MASS INDEX: 20.91 KG/M2

## 2023-11-06 DIAGNOSIS — N28.1 RENAL CYST: ICD-10-CM

## 2023-11-06 DIAGNOSIS — E53.8 LOW SERUM VITAMIN B12: ICD-10-CM

## 2023-11-06 DIAGNOSIS — E55.9 VITAMIN D DEFICIENCY: ICD-10-CM

## 2023-11-06 DIAGNOSIS — E78.00 ELEVATED LDL CHOLESTEROL LEVEL: ICD-10-CM

## 2023-11-06 DIAGNOSIS — I65.29 MILD ATHEROSCLEROSIS OF CAROTID ARTERY, UNSPECIFIED LATERALITY: ICD-10-CM

## 2023-11-06 DIAGNOSIS — M81.6 LOCALIZED OSTEOPOROSIS WITHOUT PATHOLOGICAL FRACTURE: ICD-10-CM

## 2023-11-06 DIAGNOSIS — N39.0 RECURRENT UTI: ICD-10-CM

## 2023-11-06 DIAGNOSIS — Z23 NEED FOR INFLUENZA VACCINATION: ICD-10-CM

## 2023-11-06 DIAGNOSIS — Z87.898 HISTORY OF ABNORMAL MAMMOGRAM: ICD-10-CM

## 2023-11-06 DIAGNOSIS — I65.29 CAROTID ATHEROSCLEROSIS, UNSPECIFIED LATERALITY: ICD-10-CM

## 2023-11-06 DIAGNOSIS — R47.89 WORD FINDING DIFFICULTY: Primary | ICD-10-CM

## 2023-11-06 DIAGNOSIS — E55.9 VITAMIN D INSUFFICIENCY: ICD-10-CM

## 2023-11-06 DIAGNOSIS — Z11.59 ENCOUNTER FOR HEPATITIS C SCREENING TEST FOR LOW RISK PATIENT: ICD-10-CM

## 2023-11-06 DIAGNOSIS — R41.3 MEMORY CHANGE: ICD-10-CM

## 2023-11-06 PROBLEM — E03.9 HYPOTHYROIDISM: Status: ACTIVE | Noted: 2023-09-11

## 2023-11-06 PROCEDURE — G0008 ADMIN INFLUENZA VIRUS VAC: HCPCS | Performed by: INTERNAL MEDICINE

## 2023-11-06 PROCEDURE — 1126F AMNT PAIN NOTED NONE PRSNT: CPT | Performed by: INTERNAL MEDICINE

## 2023-11-06 PROCEDURE — 99215 OFFICE O/P EST HI 40 MIN: CPT | Performed by: INTERNAL MEDICINE

## 2023-11-06 PROCEDURE — 82607 VITAMIN B-12: CPT

## 2023-11-06 PROCEDURE — 1160F RVW MEDS BY RX/DR IN RCRD: CPT | Performed by: INTERNAL MEDICINE

## 2023-11-06 PROCEDURE — 36415 COLL VENOUS BLD VENIPUNCTURE: CPT

## 2023-11-06 PROCEDURE — 90662 IIV NO PRSV INCREASED AG IM: CPT | Performed by: INTERNAL MEDICINE

## 2023-11-06 PROCEDURE — 1159F MED LIST DOCD IN RCRD: CPT | Performed by: INTERNAL MEDICINE

## 2023-11-06 PROCEDURE — 1036F TOBACCO NON-USER: CPT | Performed by: INTERNAL MEDICINE

## 2023-11-06 RX ORDER — PRAVASTATIN SODIUM 20 MG/1
20 TABLET ORAL DAILY
Qty: 30 TABLET | Refills: 5 | Status: SHIPPED | OUTPATIENT
Start: 2023-11-06 | End: 2024-03-20 | Stop reason: SDUPTHER

## 2023-11-06 RX ORDER — ESTRADIOL 0.1 MG/G
2 CREAM VAGINAL DAILY
COMMUNITY

## 2023-11-06 ASSESSMENT — PAIN SCALES - GENERAL: PAINLEVEL: 0-NO PAIN

## 2023-11-06 NOTE — PATIENT INSTRUCTIONS
Blood test today to check on B12 level: let us know what dose you are taking.    Your vitamin d level was normal fyi, the info we called you about stating it was low at 29 was an old value, and not a correct statement.    Add pravastatin 20 mg once per day after supper or at bedtime.  This is to reduce cholesterol with goal of LDL cholesterol close to or below 70 (it is currently 144).  I t is also to reduce/stabilize plaque and reduce cardio and cerebrovascular risk.  Call if any issues. Pravastatin is less likely to cause aching as you had with vytorin in the past.    Fasting blood test on this medication in 3 months.    Please schedule your bone density closer to your 3-4 month follow up visit. And get the fasting test to check on pravastatin the same day.    You had your flu shot today.  Recommend updated covid booster.    Appt at 3-4 months.

## 2023-11-07 LAB — VIT B12 SERPL-MCNC: 481 PG/ML (ref 211–911)

## 2023-11-18 PROBLEM — N13.30 HYDRONEPHROSIS, LEFT: Status: RESOLVED | Noted: 2023-06-20 | Resolved: 2023-11-18

## 2024-03-19 NOTE — PROGRESS NOTES
Subjective   Patient ID: Ange Delaney is a 84 y.o. female who presents for Follow-up (Pt here for follow up and review. PT reports that she did not get her labs done.  Pt is having dental surgery and needed EKG, so one has been done today.).  LAST VISIT PLAN 11/6/23  Ange was seen today for follow-up.  Diagnoses and all orders for this visit:  Vitamin D deficiency (Primary)  Elevated LDL cholesterol level  -     pravastatin (Pravachol) 20 mg tablet; Take 1 tablet (20 mg) by mouth once daily.  -     Lipid Panel; Future  -     Aspartate Aminotransferase; Future  -     Alanine Aminotransferase; Future  -     CK; Future  Localized osteoporosis without pathological fracture  Low serum vitamin B12  -     Vitamin B12; Future  Memory change  Renal cyst  Comments:  micheal 2, decr in size 2020 to 2023, sees dr willingham, follow up sept 2024  Need for influenza vaccination  -     Flu vaccine, quadrivalent, high-dose, preservative free, age 65y+ (FLUZONE)  Encounter for hepatitis C screening test for low risk patient  -     Hepatitis C Antibody; Future  Mild atherosclerosis of carotid artery, unspecified laterality  -     pravastatin (Pravachol) 20 mg tablet; Take 1 tablet (20 mg) by mouth once daily.  Carotid atherosclerosis, unspecified laterality  Vitamin D insufficiency  Word finding difficulty  Comments:  improved  History of abnormal mammogram  Recurrent UTI  Better on vag Estrogen per uro. Hydronphroiss resolved  Vaccine counseling done.  She is doing well.   Osteoporosis-She is not on prescription meds for her op , she has improved her vitamind intake and is exercising, needs current dexa.   Breast issue on mammogram resolved on August testing.  Cholesterol not at goal, has carotid plaque : will start pravastatin with goal of ldl below 70 and labs will harvey on the pravastatin  Flu shot today, screen hep c future labs  Renal cyst, on track with follow up with uro    See wrap up section for patient instructions and   additional treatment plan.     Blood test today to check on B12 level: let us know what dose you are taking.    Your vitamin d level was normal fyi, the info we called you about stating it was low at 29 was an old value, and not a correct statement.    Add pravastatin 20 mg once per day after supper or at bedtime.  This is to reduce cholesterol with goal of LDL cholesterol close to or below 70 (it is currently 144).  I t is also to reduce/stabilize plaque and reduce cardio and cerebrovascular risk.  Call if any issues. Pravastatin is less likely to cause aching as you had with vytorin in the past.    Fasting blood test on this medication in 3 months.    Please schedule your bone density closer to your 3-4 month follow up visit. And get the fasting test to check on pravastatin the same day.    You had your flu shot today.  Recommend updated covid booster.    Appt at 3-4 months.  END INFO FROM PRIOR VISIT  HPI  Doing well with statin, no issues.  Labs are due, she forgot to get them done to check on statin.  She had cataract surgery with iol.  She has to have extensive dental work an d implants by dr tmi amrbocio. He wants cxr ecg cbc pt ptt bmp labs.  Ecg normal today.    Re osteoporosis, will hold off on rx meds until see updated dexa on her vit d calcium and exercise and find out when she could start meds from dental standpoint.    Cataract surgery feb 2024, both eyes  Review of Systems  See ROS in HPI  Cardiac: No CP , palpitations, increased benton  Resp: No sob, wheezing, cough  Extremities: No leg or ankle swelling, no claudication  Neuro: No dizziness, syncope, blurred vision. No new headaches.    Current Outpatient Medications   Medication Instructions    ascorbic acid (Vitamin C) 500 mg tablet 1 tablet, oral, Daily    chencho cit-mag-D3-Zn--man-bor (Citracal-D3 Plus Magnesium) 250- mg-mg-unit tablet oral    cholecalciferol (VITAMIN D-3) 50 mcg, oral, Daily    cyanocobalamin (Vitamin B-12) 1,000 mcg tablet  1 tablet, oral, Daily    estradiol (ESTRACE) 2 g, vaginal, Daily    fish oil concentrate (Omega-3) 120-180 mg capsule 1 g, oral, Daily    pravastatin (PRAVACHOL) 20 mg, oral, Daily     Allergies   Allergen Reactions    Alendronate Unknown    Doxycycline Unknown    Ezetimibe-Simvastatin Myalgia     Objective    Latest Reference Range & Units 03/20/24 09:49   ALT 7 - 45 U/L 14   AST 9 - 39 U/L 23   HDL CHOLESTEROL mg/dL 70.1   Cholesterol/HDL Ratio  3.1   LDL Calculated <=99 mg/dL 122 (H)   VLDL 0 - 40 mg/dL 25   TRIGLYCERIDES 0 - 149 mg/dL 125   Non HDL Cholesterol 0 - 149 mg/dL 147   Creatine Kinase 0 - 215 U/L 74   CHOLESTEROL 0 - 199 mg/dL 217 (H)   Vitamin B12 211 - 911 pg/mL 1,232 (H)   WBC 4.4 - 11.3 x10*3/uL 6.1   nRBC 0.0 - 0.0 /100 WBCs 0.0   RBC 4.00 - 5.20 x10*6/uL 4.76   HEMOGLOBIN 12.0 - 16.0 g/dL 14.9   HEMATOCRIT 36.0 - 46.0 % 46.2 (H)   MCV 80 - 100 fL 97   MCH 26.0 - 34.0 pg 31.3   MCHC 32.0 - 36.0 g/dL 32.3   RED CELL DISTRIBUTION WIDTH 11.5 - 14.5 % 13.3   Platelets 150 - 450 x10*3/uL 248   Neutrophils % 40.0 - 80.0 % 55.5   Immature Granulocytes %, Automated 0.0 - 0.9 % 0.2   Lymphocytes % 13.0 - 44.0 % 31.7   Monocytes % 2.0 - 10.0 % 10.9   Eosinophils % 0.0 - 6.0 % 1.2   Basophils % 0.0 - 2.0 % 0.5   Neutrophils Absolute 1.60 - 5.50 x10*3/uL 3.38   Immature Granulocytes Absolute, Automated 0.00 - 0.50 x10*3/uL 0.01   Lymphocytes Absolute 0.80 - 3.00 x10*3/uL 1.93   Monocytes Absolute 0.05 - 0.80 x10*3/uL 0.66   Eosinophils Absolute 0.00 - 0.40 x10*3/uL 0.07   Basophils Absolute 0.00 - 0.10 x10*3/uL 0.03   Hepatitis C AB Nonreactive  Nonreactive   (H): Data is abnormally high      Lab Results   Component Value Date    WBC 6.1 03/20/2024    HGB 14.9 03/20/2024    HCT 46.2 (H) 03/20/2024     03/20/2024    CHOL 217 (H) 03/20/2024    TRIG 125 03/20/2024    HDL 70.1 03/20/2024    ALT 14 03/20/2024    AST 23 03/20/2024     09/11/2023    K 4.3 09/11/2023     09/11/2023    CREATININE  "0.82 09/11/2023    BUN 21 09/11/2023    CO2 27 09/11/2023    TSH 3.80 09/11/2023     par  Physical ExamBP 132/72 (BP Location: Left arm, Patient Position: Sitting, BP Cuff Size: Adult)   Pulse 60   Resp 18   Ht 1.6 m (5' 3\")   Wt 51.7 kg (114 lb)   BMI 20.19 kg/m²   Patient looks well and is in no obvious distress.  HEENT:   Normocephalic, no facial asymmetry  Eyes: Sclera and conjunctiva are clear.  Neck: No adenopathy cervical (Ant/post/lat), no Supraclavicular nodes.   Thyroid normal.   Carotid pulses normal, no carotid bruits.  Lungs : RR normal. Clear to auscultation anterior, posterior and lateral. No rales, wheezes rhonchi or rubs. Good air exchange.  Heart: RRR. No Murmur heard today, no  gallop, click or rub.  Vascular:  Posterior tibialis pulses within normal limits bilaterally.   Extremities: no upper extremity edema. No lower extremity edema.   Musculoskeletal: no synovitis of joints seen. No new deformity.  Neuro: CN 2-12 intact. Alert, appropriate.   Ambulates independently.  No gross motor deficit.   No tremors.  Psych: normal mood and affect.  Skin: No rash, bruising petechiae or jaundice.          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    Encounter for monitoring statin therapy        Relevant Orders    Alanine Aminotransferase (Completed)    Aspartate Aminotransferase (Completed)    Creatine Kinase (Completed)    Lipid Panel (Completed)    Elevated low density lipoprotein " (LDL) cholesterol level        Relevant Orders    Alanine Aminotransferase (Completed)    Aspartate Aminotransferase (Completed)    Lipid Panel (Completed)    Encounter for hepatitis C screening test for low risk patient        Relevant Orders    Hepatitis C Antibody (Completed)    Low serum vitamin B12        Relevant Orders    CBC and Auto Differential (Completed)    Vitamin B12 (Completed)    Preoperative clearance              Re osteoporosis, will hold off on rx meds until see updated dexa on her vit d calcium and exercise and find out when she could start meds from dental standpoint.    See wrap up section for patient instructions and  additional treatment plan.  I am the primary care physician for this patient's ongoing medical care, which is managed during and in between office visits.    Addendum: 9 pm: labs resulted and cbc good, ast alt ok, cholesterol not at goal tho is mildly improved. B12 high but she took her vitamin today, it is ok, no changes made.  Hep c neg. Will contact pt.  Will plan to increase pravastatin.

## 2024-03-20 ENCOUNTER — HOSPITAL ENCOUNTER (OUTPATIENT)
Dept: RADIOLOGY | Facility: CLINIC | Age: 84
Discharge: HOME | End: 2024-03-20
Payer: MEDICARE

## 2024-03-20 ENCOUNTER — OFFICE VISIT (OUTPATIENT)
Dept: PRIMARY CARE | Facility: CLINIC | Age: 84
End: 2024-03-20
Payer: MEDICARE

## 2024-03-20 ENCOUNTER — LAB (OUTPATIENT)
Dept: LAB | Facility: LAB | Age: 84
End: 2024-03-20
Payer: MEDICARE

## 2024-03-20 VITALS
HEART RATE: 60 BPM | DIASTOLIC BLOOD PRESSURE: 72 MMHG | WEIGHT: 114 LBS | HEIGHT: 63 IN | BODY MASS INDEX: 20.2 KG/M2 | RESPIRATION RATE: 18 BRPM | SYSTOLIC BLOOD PRESSURE: 132 MMHG

## 2024-03-20 DIAGNOSIS — Z51.81 ENCOUNTER FOR MONITORING STATIN THERAPY: ICD-10-CM

## 2024-03-20 DIAGNOSIS — Z96.1 STATUS POST CATARACT EXTRACTION AND INSERTION OF INTRAOCULAR LENS, UNSPECIFIED LATERALITY: ICD-10-CM

## 2024-03-20 DIAGNOSIS — E78.00 ELEVATED LOW DENSITY LIPOPROTEIN (LDL) CHOLESTEROL LEVEL: ICD-10-CM

## 2024-03-20 DIAGNOSIS — Z79.899 ENCOUNTER FOR MONITORING STATIN THERAPY: ICD-10-CM

## 2024-03-20 DIAGNOSIS — Z11.59 ENCOUNTER FOR HEPATITIS C SCREENING TEST FOR LOW RISK PATIENT: ICD-10-CM

## 2024-03-20 DIAGNOSIS — Z01.812 ENCOUNTER FOR PREPROCEDURAL LABORATORY EXAMINATION: ICD-10-CM

## 2024-03-20 DIAGNOSIS — E78.00 ELEVATED LDL CHOLESTEROL LEVEL: Primary | ICD-10-CM

## 2024-03-20 DIAGNOSIS — E53.8 LOW SERUM VITAMIN B12: ICD-10-CM

## 2024-03-20 DIAGNOSIS — Z01.818 PREOPERATIVE CLEARANCE: ICD-10-CM

## 2024-03-20 DIAGNOSIS — I65.29 CAROTID ATHEROSCLEROSIS, UNSPECIFIED LATERALITY: ICD-10-CM

## 2024-03-20 DIAGNOSIS — I65.29 MILD ATHEROSCLEROSIS OF CAROTID ARTERY, UNSPECIFIED LATERALITY: ICD-10-CM

## 2024-03-20 DIAGNOSIS — Z01.810 ENCOUNTER FOR PREPROCEDURAL CARDIOVASCULAR EXAMINATION: ICD-10-CM

## 2024-03-20 DIAGNOSIS — M81.6 LOCALIZED OSTEOPOROSIS WITHOUT PATHOLOGICAL FRACTURE: ICD-10-CM

## 2024-03-20 DIAGNOSIS — Z98.49 STATUS POST CATARACT EXTRACTION AND INSERTION OF INTRAOCULAR LENS, UNSPECIFIED LATERALITY: ICD-10-CM

## 2024-03-20 DIAGNOSIS — Z01.811 ENCOUNTER FOR PREPROCEDURAL RESPIRATORY EXAMINATION: ICD-10-CM

## 2024-03-20 LAB
ALT SERPL W P-5'-P-CCNC: 14 U/L (ref 7–45)
AST SERPL W P-5'-P-CCNC: 23 U/L (ref 9–39)
BASOPHILS # BLD AUTO: 0.03 X10*3/UL (ref 0–0.1)
BASOPHILS NFR BLD AUTO: 0.5 %
CHOLEST SERPL-MCNC: 217 MG/DL (ref 0–199)
CHOLESTEROL/HDL RATIO: 3.1
CK SERPL-CCNC: 74 U/L (ref 0–215)
EOSINOPHIL # BLD AUTO: 0.07 X10*3/UL (ref 0–0.4)
EOSINOPHIL NFR BLD AUTO: 1.2 %
ERYTHROCYTE [DISTWIDTH] IN BLOOD BY AUTOMATED COUNT: 13.3 % (ref 11.5–14.5)
HCT VFR BLD AUTO: 46.2 % (ref 36–46)
HCV AB SER QL: NONREACTIVE
HDLC SERPL-MCNC: 70.1 MG/DL
HGB BLD-MCNC: 14.9 G/DL (ref 12–16)
IMM GRANULOCYTES # BLD AUTO: 0.01 X10*3/UL (ref 0–0.5)
IMM GRANULOCYTES NFR BLD AUTO: 0.2 % (ref 0–0.9)
LDLC SERPL CALC-MCNC: 122 MG/DL
LYMPHOCYTES # BLD AUTO: 1.93 X10*3/UL (ref 0.8–3)
LYMPHOCYTES NFR BLD AUTO: 31.7 %
MCH RBC QN AUTO: 31.3 PG (ref 26–34)
MCHC RBC AUTO-ENTMCNC: 32.3 G/DL (ref 32–36)
MCV RBC AUTO: 97 FL (ref 80–100)
MONOCYTES # BLD AUTO: 0.66 X10*3/UL (ref 0.05–0.8)
MONOCYTES NFR BLD AUTO: 10.9 %
NEUTROPHILS # BLD AUTO: 3.38 X10*3/UL (ref 1.6–5.5)
NEUTROPHILS NFR BLD AUTO: 55.5 %
NON HDL CHOLESTEROL: 147 MG/DL (ref 0–149)
NRBC BLD-RTO: 0 /100 WBCS (ref 0–0)
PLATELET # BLD AUTO: 248 X10*3/UL (ref 150–450)
RBC # BLD AUTO: 4.76 X10*6/UL (ref 4–5.2)
TRIGL SERPL-MCNC: 125 MG/DL (ref 0–149)
VIT B12 SERPL-MCNC: 1232 PG/ML (ref 211–911)
VLDL: 25 MG/DL (ref 0–40)
WBC # BLD AUTO: 6.1 X10*3/UL (ref 4.4–11.3)

## 2024-03-20 PROCEDURE — 84450 TRANSFERASE (AST) (SGOT): CPT

## 2024-03-20 PROCEDURE — 85025 COMPLETE CBC W/AUTO DIFF WBC: CPT

## 2024-03-20 PROCEDURE — 1157F ADVNC CARE PLAN IN RCRD: CPT | Performed by: INTERNAL MEDICINE

## 2024-03-20 PROCEDURE — 82607 VITAMIN B-12: CPT

## 2024-03-20 PROCEDURE — 1036F TOBACCO NON-USER: CPT | Performed by: INTERNAL MEDICINE

## 2024-03-20 PROCEDURE — 1126F AMNT PAIN NOTED NONE PRSNT: CPT | Performed by: INTERNAL MEDICINE

## 2024-03-20 PROCEDURE — 71046 X-RAY EXAM CHEST 2 VIEWS: CPT

## 2024-03-20 PROCEDURE — 1159F MED LIST DOCD IN RCRD: CPT | Performed by: INTERNAL MEDICINE

## 2024-03-20 PROCEDURE — 84460 ALANINE AMINO (ALT) (SGPT): CPT

## 2024-03-20 PROCEDURE — 93000 ELECTROCARDIOGRAM COMPLETE: CPT | Performed by: INTERNAL MEDICINE

## 2024-03-20 PROCEDURE — 99213 OFFICE O/P EST LOW 20 MIN: CPT | Performed by: INTERNAL MEDICINE

## 2024-03-20 PROCEDURE — 82550 ASSAY OF CK (CPK): CPT

## 2024-03-20 PROCEDURE — 86803 HEPATITIS C AB TEST: CPT

## 2024-03-20 PROCEDURE — G2211 COMPLEX E/M VISIT ADD ON: HCPCS | Performed by: INTERNAL MEDICINE

## 2024-03-20 PROCEDURE — 80061 LIPID PANEL: CPT

## 2024-03-20 PROCEDURE — 36415 COLL VENOUS BLD VENIPUNCTURE: CPT

## 2024-03-20 RX ORDER — PRAVASTATIN SODIUM 40 MG/1
40 TABLET ORAL DAILY
Qty: 30 TABLET | Refills: 11 | Status: SHIPPED | OUTPATIENT
Start: 2024-03-20

## 2024-03-20 ASSESSMENT — PAIN SCALES - GENERAL: PAINLEVEL: 0-NO PAIN

## 2024-03-20 NOTE — PATIENT INSTRUCTIONS
ECG today is fine.Normal. You have a copy.  Blood test today .  Also take the blood test and urine test and chest xray order to the lab that Dr WINCHESTER gave you.    Schedule Bone density during the 2 weeks before your July appointment, we can go over it at your annual visit.    Ask Dr Haley: If we need to treat your osteoporosis with a prescription medication such as Foxamax or Prolia, How long would he want us to wait to start it, given that you are having implants done in April.    Keep July annual appt.    Addendum after labs resulted: ldl is 122, goal is closer to 70 or at least below 100. Hx statin intolerance in the past with simvastatin. Will increase pravastatin gently to 40mg daily. See message to call patient. She can start the 40mg when she finishes her 20mg tabs. Rx sent to pharmacy.

## 2024-03-21 NOTE — RESULT ENCOUNTER NOTE
"Please call the patient regarding her result.I sent in a new rx for her.  Cholesterol is 217 and ldl or the \"bad \" cholesterol is 122. This ldl should be closer to 70 and at least under 100.  I recommend she increase pravastatin gently, to the 40mg tab and take one per day. Start this after she uses up her 20mg tabsl.  We can check labs in a few months.  I sent the prescription to her local pharmacy.    Her b12 is a little high but it is ok to stay on the same supplement dose.     Other tests are fine."

## 2024-03-29 ENCOUNTER — LAB (OUTPATIENT)
Dept: LAB | Facility: LAB | Age: 84
End: 2024-03-29
Payer: MEDICARE

## 2024-03-29 DIAGNOSIS — Z01.812 ENCOUNTER FOR PREPROCEDURAL LABORATORY EXAMINATION: Primary | ICD-10-CM

## 2024-03-29 PROCEDURE — 81001 URINALYSIS AUTO W/SCOPE: CPT

## 2024-03-29 PROCEDURE — 85610 PROTHROMBIN TIME: CPT

## 2024-03-29 PROCEDURE — 85730 THROMBOPLASTIN TIME PARTIAL: CPT

## 2024-03-29 PROCEDURE — 36415 COLL VENOUS BLD VENIPUNCTURE: CPT

## 2024-03-30 LAB
APPEARANCE UR: ABNORMAL
APTT PPP: 33 SECONDS (ref 27–38)
BACTERIA #/AREA URNS AUTO: ABNORMAL /HPF
BILIRUB UR STRIP.AUTO-MCNC: NEGATIVE MG/DL
COLOR UR: ABNORMAL
GLUCOSE UR STRIP.AUTO-MCNC: NORMAL MG/DL
INR PPP: 0.9 (ref 0.9–1.1)
KETONES UR STRIP.AUTO-MCNC: NEGATIVE MG/DL
LEUKOCYTE ESTERASE UR QL STRIP.AUTO: ABNORMAL
MUCOUS THREADS #/AREA URNS AUTO: ABNORMAL /LPF
NITRITE UR QL STRIP.AUTO: NEGATIVE
PH UR STRIP.AUTO: 7 [PH]
PROT UR STRIP.AUTO-MCNC: NEGATIVE MG/DL
PROTHROMBIN TIME: 10.4 SECONDS (ref 9.8–12.8)
RBC # UR STRIP.AUTO: NEGATIVE /UL
RBC #/AREA URNS AUTO: ABNORMAL /HPF
SP GR UR STRIP.AUTO: 1.01
SQUAMOUS #/AREA URNS AUTO: ABNORMAL /HPF
UROBILINOGEN UR STRIP.AUTO-MCNC: NORMAL MG/DL
WBC #/AREA URNS AUTO: ABNORMAL /HPF

## 2024-04-02 ENCOUNTER — TELEPHONE (OUTPATIENT)
Dept: PRIMARY CARE | Facility: CLINIC | Age: 84
End: 2024-04-02
Payer: COMMERCIAL

## 2024-04-02 DIAGNOSIS — R82.90 ABNORMAL URINALYSIS: Primary | ICD-10-CM

## 2024-04-02 NOTE — TELEPHONE ENCOUNTER
"Patient's Daughter stated \"she is having no symptoms of UTI\" and her Dental Surgeon needs clearance from you.  "

## 2024-04-02 NOTE — TELEPHONE ENCOUNTER
Patient called because she is having dental implant surgery on 4/10.   That Dr says she needs surgery clearance from you because her urinalysis was abnormal.     Ange [phone]

## 2024-04-03 ENCOUNTER — LAB (OUTPATIENT)
Dept: LAB | Facility: LAB | Age: 84
End: 2024-04-03
Payer: MEDICARE

## 2024-04-03 ENCOUNTER — HOSPITAL ENCOUNTER (OUTPATIENT)
Dept: RADIOLOGY | Facility: CLINIC | Age: 84
Discharge: HOME | End: 2024-04-03
Payer: MEDICARE

## 2024-04-03 DIAGNOSIS — M81.6 LOCALIZED OSTEOPOROSIS WITHOUT PATHOLOGICAL FRACTURE: ICD-10-CM

## 2024-04-03 DIAGNOSIS — R82.90 ABNORMAL URINALYSIS: ICD-10-CM

## 2024-04-03 LAB
APPEARANCE UR: CLEAR
BILIRUB UR STRIP.AUTO-MCNC: NEGATIVE MG/DL
COLOR UR: COLORLESS
GLUCOSE UR STRIP.AUTO-MCNC: NORMAL MG/DL
KETONES UR STRIP.AUTO-MCNC: NEGATIVE MG/DL
LEUKOCYTE ESTERASE UR QL STRIP.AUTO: NEGATIVE
NITRITE UR QL STRIP.AUTO: NEGATIVE
PH UR STRIP.AUTO: 7 [PH]
PROT UR STRIP.AUTO-MCNC: NEGATIVE MG/DL
RBC # UR STRIP.AUTO: NEGATIVE /UL
SP GR UR STRIP.AUTO: 1
UROBILINOGEN UR STRIP.AUTO-MCNC: NORMAL MG/DL

## 2024-04-03 PROCEDURE — 87086 URINE CULTURE/COLONY COUNT: CPT

## 2024-04-03 PROCEDURE — 77080 DXA BONE DENSITY AXIAL: CPT

## 2024-04-03 PROCEDURE — 81003 URINALYSIS AUTO W/O SCOPE: CPT

## 2024-04-04 LAB — BACTERIA UR CULT: NO GROWTH

## 2024-07-05 NOTE — PROGRESS NOTES
Subjective   Patient ID: Ange Delaney is a 84 y.o. female who presents for annual medicare wellness visit and follow up on conditions. She is not here for an annual physical:it is not covered by her insurance. She is here with her daughter.    LAST VISIT PLAN FROM: 03/20/2024  Problem List as of 7/9/2024 Reviewed: 11/6/2023  9:23 AM by Jadyn Nunez MD      Elevated LDL cholesterol level    Vitamin D insufficiency    Carotid atherosclerosis    Localized osteoporosis without pathological fracture    Osteopenia of left hip    Renal cyst    Recurrent UTI    Last Assessment & Plan 11/6/2023 Office Visit Written 11/6/2023  9:24 AM by Jadyn Nunez MD     Improving on vaginal estrogen cream.         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    Encounter for monitoring statin therapy        Relevant Orders    Alanine Aminotransferase (Completed)    Aspartate Aminotransferase (Completed)    Creatine Kinase (Completed)    Lipid Panel (Completed)    Elevated low density lipoprotein (LDL) cholesterol level        Relevant Orders    Alanine Aminotransferase (Completed)    Aspartate Aminotransferase (Completed)    Lipid Panel (Completed)    Encounter for hepatitis C screening test for low risk patient        Relevant Orders    Hepatitis C Antibody (Completed)    Low serum vitamin B12        Relevant Orders    CBC and Auto Differential (Completed)    Vitamin B12 (Completed)     Preoperative clearance              Re osteoporosis, will hold off on rx meds until see updated dexa on her vit d calcium and exercise and find out when she could start meds from dental standpoint.  See wrap up section for patient instructions and  additional treatment plan.  I am the primary care physician for this patient's ongoing medical care, which is managed during and in between office visits.  Addendum: 9 pm: labs resulted and cbc good, ast alt ok, cholesterol not at goal tho is mildly improved. B12 high but she took her vitamin today, it is ok, no changes made.  Hep c neg. Will contact pt.  Will plan to increase pravastatin.  END INFO FROM PRIOR VISIT    HPI  Health maintenance items last completed:  Colonoscopy: 05/2023; A 2 mm polyp removed in ascending colon, a 4 mm polyp removed in descending colon, normal mucosa throughout entire colon.  Repeat 5 years.  Due 05/2028.  Mammogram: 07/31/2023; No evidence of malignancy left breast, repeat right MLO with tomosynthesis recommended, BI RADS category 0.  Need additional imaging.  Due 07/31/2024.  Bone Density: 04/03/2024: LEFT FEMUR -TOTAL T-Score -2.2,   LEFT FEMUR -NECK T-Score -2.4 Repeat due in 04/03/2026.    Urine albumin if HTN or DM2: 4.3 on 09/11/2023.  Breast exam in office: 06/20/2023.  Vaccines due or not completed: UTD; RSV, Covid vaccine due in Fall 2024, pneumococcal vaccine due 11/06/2024.  Last Health Maintenance exam: 06/20/2023     Scribe:  Patient is an 84-year-old female who presents today with her daughter, Gardenia, for Annual Wellness Visit. She has no new significant concerns today.      I reviewed the questions and answers of the Medicare Wellness Visit form.  I discussed code status with the patient, and they understand the difference between full code and DNR.  She is full code.  I discussed HCPOA and LW.  Her first HCPOA is her daughter, Gardenia Paris, and the second HCPOA is her son, Russel Delaney.  We have both HCPOA and her living well on  file.     We reviewed and updated the patient's family, medical, surgical and social history.    We reviewed and updated patient's medication list.  Refills prescribed as required.    We discussed her DEXA scans including results from 04/03/2024 versus 10/2021.  The BMD of left total femur was 0.755 in 10/2021 and was 0.730 in 04/2024.  The left femur neck BMD was 0.686 in 10/2021 versus 0.707 in 04/2024.  Results are stable.     History of Bosniak II Cyst:  She saw Dr. Monzon in September 2023 who recommended follow up in 1 year.  Number provided for her to schedule an appointment.    Memory:  Patient states, she thinks she is okay compared to other people her age.  He daughter does not notice any significant memory concerns other than missing the occasional word.  We discussed the importance of wearing her hearing aids on a regular basis so she is able to hear everything properly.    Weight Loss:  Patient states she has lost some weight since last visit.  She is down a few pounds since last visit and approximately down 10 pounds from 1.5 years ago due to extensive dental surgery.  Recommended adding Ensure or Boost to her diet every other day for supplementation if she is unable to eat.    Health Maintenance:  Mammogram ordered.  Fasting blood work and UA ordered for September 2024.    We discussed vaccines.  She is UTD on tetanus.  RSV left to patient's discretion.  We discussed getting the RSV if coming into contact with newborns, Covid vaccine, and influenza in Fall 2024, pneumococcal vaccine due 11/06/2024.    Follow up in 1 year.    Review of Systems  All systems reviewed and are negative unless otherwise stated in HPI or noted in writing on the written   3  page history and review of systems document reviewed by me and  scanned in with this visit. This is scanned either to notes or to media, with today's date.  She noted she has hearing aids, she sometimes cannot thing of words but neither she nor her dtr  think that is any worse, and occasional urinary urgency not new or worse.  Current Outpatient Medications   Medication Instructions    ascorbic acid (Vitamin C) 500 mg tablet 1 tablet, oral, Daily    chencho cit-mag-D3-Zn--man-bor (Citracal-D3 Plus Magnesium) 250- mg-mg-unit tablet oral    cholecalciferol (VITAMIN D-3) 50 mcg, oral, Daily    cyanocobalamin (Vitamin B-12) 1,000 mcg tablet 1 tablet, oral, Daily    estradiol (ESTRACE) 2 g, vaginal, Daily    pravastatin (PRAVACHOL) 40 mg, oral, Daily     Allergies   Allergen Reactions    Alendronate Unknown    Doxycycline Unknown    Ezetimibe-Simvastatin Myalgia     Objective   Blood work 03/20/2024:   Latest Reference Range & Units 03/20/24 09:49   ALT 7 - 45 U/L 14   AST 9 - 39 U/L 23   HDL CHOLESTEROL mg/dL 70.1   Cholesterol/HDL Ratio  3.1   LDL Calculated <=99 mg/dL 122 (H)   VLDL 0 - 40 mg/dL 25   TRIGLYCERIDES 0 - 149 mg/dL 125   Non HDL Cholesterol 0 - 149 mg/dL 147   Creatine Kinase 0 - 215 U/L 74   CHOLESTEROL 0 - 199 mg/dL 217 (H)   Vitamin B12 211 - 911 pg/mL 1,232 (H)   LEUKOCYTES (10*3/UL) IN BLOOD BY AUTOMATED COUNT, Jordanian 4.4 - 11.3 x10*3/uL 6.1   nRBC 0.0 - 0.0 /100 WBCs 0.0   ERYTHROCYTES (10*6/UL) IN BLOOD BY AUTOMATED COUNT, Jordanian 4.00 - 5.20 x10*6/uL 4.76   HEMOGLOBIN 12.0 - 16.0 g/dL 14.9   HEMATOCRIT 36.0 - 46.0 % 46.2 (H)   MCV 80 - 100 fL 97   MCH 26.0 - 34.0 pg 31.3   MCHC 32.0 - 36.0 g/dL 32.3   RED CELL DISTRIBUTION WIDTH 11.5 - 14.5 % 13.3   PLATELETS (10*3/UL) IN BLOOD AUTOMATED COUNT, Jordanian 150 - 450 x10*3/uL 248   NEUTROPHILS/100 LEUKOCYTES IN BLOOD BY AUTOMATED COUNT, Jordanian 40.0 - 80.0 % 55.5   Immature Granulocytes %, Automated 0.0 - 0.9 % 0.2   Lymphocytes % 13.0 - 44.0 % 31.7   Monocytes % 2.0 - 10.0 % 10.9   Eosinophils % 0.0 - 6.0 % 1.2   Basophils % 0.0 - 2.0 % 0.5   NEUTROPHILS (10*3/UL) IN BLOOD BY AUTOMATED COUNT, [place]ian 1.60 - 5.50 x10*3/uL 3.38   Immature Granulocytes Absolute, Automated 0.00 - 0.50 x10*3/uL  "0.01   Lymphocytes Absolute 0.80 - 3.00 x10*3/uL 1.93   Monocytes Absolute 0.05 - 0.80 x10*3/uL 0.66   Eosinophils Absolute 0.00 - 0.40 x10*3/uL 0.07   Basophils Absolute 0.00 - 0.10 x10*3/uL 0.03   Hepatitis C AB Nonreactive  Nonreactive   (H): Data is abnormally high    DEXA Scan 04/03/2024:  FINDINGS:  SPINE L1-L4  Bone Mineral Density: Not reported  T-Score Not reported  Z-Score Not reported  Classification:  Not reported  Bone Mineral Density change vs baseline:  Not reported  Bone Mineral Density change vs previous: Not reported  LEFT FEMUR -TOTAL  Bone Mineral Density: 0.730  T-Score -2.2   Z-Score  0.0  Classification:  Not reported  Bone Mineral Density change vs baseline: Not reported  Bone Mineral Density change vs previous: Not reported  LEFT FEMUR -NECK  Bone Mineral Density: 0.707  T-Score -2.4  Z-Score -0.1  Classification:  Not reported  World Health Organization (WHO) criteria for post-menopausal,   Women:  Normal:         T-score at or above -1 SD  Osteopenia:   T-score between -1 and -2.5 SD  Osteoporosis: T-score at or below -2.5 SD    DEXA scan 10/14/2021:  Left Femur Neck :     . Bone Density: 0.686 g/cm2 . . T Score: -2.5 . . Z Score: -0.4 . .   WHO Classification: Osteoporosis . . % Change:     Left Femur Total :     . Bone Density: 0.755 g/cm2 . . T Score: -2.0 . . Z Score: 0.0 . .   WHO Classification: Osteopenia . . % Change:   *   AP Spine L1-L4 :    . Bone Density: 1.048 g/cm2 . . T Score: -1.2 . . Z Score: 0.7 . .   WHO Classification: Osteopenia . . % Change:      Physical Exam  /64   Pulse 68   Ht 1.613 m (5' 3.5\") Comment: w/o shoes.  Wt 51.5 kg (113 lb 9.6 oz)   SpO2 98%   BMI 19.81 kg/m²       3/27/2023     3:26 PM 5/5/2023    10:20 AM 6/20/2023     8:56 AM 8/7/2023     2:41 PM 11/6/2023     8:18 AM 3/20/2024     8:21 AM 7/9/2024    10:55 AM   Vitals   Systolic 165 153 124 183 124 132 124   Diastolic 77 70 74 83 72 72 64   Heart Rate  70 72 77 64 60 68   Temp  " "36.2 °C (97.2 °F)        Resp 18 18 18 17 18 18    Height (in) 1.619 m (5' 3.75\")  1.6 m (5' 3\") 1.619 m (5' 3.75\") 1.6 m (5' 3\") 1.6 m (5' 3\") 1.613 m (5' 3.5\")   Weight (lb) 124  118 119.5 118 114 113.6   BMI 21.45 kg/m2  20.9 kg/m2 20.67 kg/m2 20.9 kg/m2 20.19 kg/m2 19.81 kg/m2   BSA (m2) 1.59 m2  1.54 m2 1.56 m2 1.54 m2 1.52 m2 1.52 m2   Visit Report   Report  Report Report Report         General: Patient is known to me, looks well, is in usual state of health, with  no obvious distress.  Head: normocephalic  Eyes: PERRL, EOMI, no scleral icterus or conjunctival abnormality  Ears: Right EAC occluded with soft cerumen (removed in office today in one piece) Left EAC normal.  TMs WNL.    Oropharynx: Well hydrated mucosa. no lesions, erythema. palate moves well.  Neck: No masses, no cervical (A/P/ or Lateral) adenopathy. Thyroid not enlarged and no nodules. Carotid pulses normal and no bruits.  Chest: Normal AP diameter. No supraclavicular adenopathy.  Lungs: Clear to auscultation: no rales , wheezes, rhonchi, rubs, examined bilaterally, ant/post /lateral. RR normal.  Heart: RRR. No MGCR S1 S2 normal.  Abd: BS normal, no bruits. No hepatosplenomegaly. No abdominal mass or tenderness. No distension.  She has a midline scar from epigastrium down to the umbilicus which is well-healed.  Extremities: No upper extremity edema. No lower leg edema.No ischemia.   Joints: no synovitis seen. No deformities.  Pulses: Posterior tibialis pulses and dorsalis pedis pulses are 1+, bilaterally.  Normal radial pulses. Normal femoral pulses without bruits.  Neuro: CN 2-12 intact . Alert, oriented, appropriate.  No focal weakness observed. No tremors. Ambulation is normal .  Psych: Mood and affect normal. No cognitive deficits noted during this exam. Alert, oriented, appropriate.  Skin: No rash, petechiae or excessive bruising.  Lymph: no SC axillary or inguinal adenopathy    Breast Exam : Symmetric appearance. No masses, nipple " discharge, skin retraction, axillary or supraclavicular adenopathy.    Ange was seen today for medicare annual wellness visit subsequent.  Diagnoses and all orders for this visit:  Encounter for subsequent annual wellness visit (AWV) in Medicare patient (Primary)  Hypercholesterolemia  -     Comprehensive metabolic panel; Future  -     Cholesterol, LDL Direct; Future  -     Lipid panel; Future  Encounter for monitoring statin therapy  -     CK; Future  -     Comprehensive metabolic panel; Future  -     Lipid panel; Future  Hypothyroidism, unspecified type  -     TSH with reflex to Free T4 if abnormal; Future  Localized osteoporosis without pathological fracture  -     Vitamin D 25-Hydroxy,Total (for eval of Vitamin D levels); Future  Full code status  Advanced directives, counseling/discussion  Encounter for screening mammogram for breast cancer  -     BI mammo bilateral screening tomosynthesis; Future  Urinary frequency  -     Urinalysis with Reflex Microscopic; Future  Other orders  -     Full code  -     Cancel: Hepatitis C antibody; Future    Medicare wellness visit  completed including:  Immunizations,   Health Maintenance items,  Discussion of advanced directives and code status, which is: full code, ok cpr, reviewed hcpoa  Fall risk and prevention addressed.  Functionality and pain were assessed.   Cancer screening testing was addressed as appropriate-see patient discussion/orders.   Medications were discussed and reviewed/reconciled and refill need assessed/handled.   Patient states taking their medication regularly and appropriately.  Also:   Depression screening PhQ-2 completed: neg  Alcohol misuse screen:neg    In addition to the wellness visit and screening, the above medical issues were addressed:  As well as Results of testing completed since last visit.    See patient instructions in wrap up plan, orders and comments for treatment plan.  Patient Instructions:    Scribe Attestation  By signing my name  below, I, Ninfa Savage, Scranu   attest that this documentation has been prepared under the direction and in the presence of Jadyn Nunez MD.

## 2024-07-09 ENCOUNTER — APPOINTMENT (OUTPATIENT)
Dept: PRIMARY CARE | Facility: CLINIC | Age: 84
End: 2024-07-09
Payer: COMMERCIAL

## 2024-07-09 VITALS
HEIGHT: 64 IN | BODY MASS INDEX: 19.39 KG/M2 | OXYGEN SATURATION: 98 % | SYSTOLIC BLOOD PRESSURE: 124 MMHG | HEART RATE: 68 BPM | DIASTOLIC BLOOD PRESSURE: 64 MMHG | WEIGHT: 113.6 LBS

## 2024-07-09 DIAGNOSIS — Z71.89 ADVANCED DIRECTIVES, COUNSELING/DISCUSSION: ICD-10-CM

## 2024-07-09 DIAGNOSIS — R35.0 URINARY FREQUENCY: ICD-10-CM

## 2024-07-09 DIAGNOSIS — Z78.9 FULL CODE STATUS: ICD-10-CM

## 2024-07-09 DIAGNOSIS — M81.6 LOCALIZED OSTEOPOROSIS WITHOUT PATHOLOGICAL FRACTURE: ICD-10-CM

## 2024-07-09 DIAGNOSIS — Z12.31 ENCOUNTER FOR SCREENING MAMMOGRAM FOR BREAST CANCER: ICD-10-CM

## 2024-07-09 DIAGNOSIS — E78.00 HYPERCHOLESTEROLEMIA: ICD-10-CM

## 2024-07-09 DIAGNOSIS — E03.9 HYPOTHYROIDISM, UNSPECIFIED TYPE: ICD-10-CM

## 2024-07-09 DIAGNOSIS — Z79.899 ENCOUNTER FOR MONITORING STATIN THERAPY: ICD-10-CM

## 2024-07-09 DIAGNOSIS — Z00.00 ENCOUNTER FOR SUBSEQUENT ANNUAL WELLNESS VISIT (AWV) IN MEDICARE PATIENT: Primary | ICD-10-CM

## 2024-07-09 DIAGNOSIS — Z51.81 ENCOUNTER FOR MONITORING STATIN THERAPY: ICD-10-CM

## 2024-07-09 PROCEDURE — 1159F MED LIST DOCD IN RCRD: CPT | Performed by: INTERNAL MEDICINE

## 2024-07-09 PROCEDURE — 1160F RVW MEDS BY RX/DR IN RCRD: CPT | Performed by: INTERNAL MEDICINE

## 2024-07-09 PROCEDURE — G0439 PPPS, SUBSEQ VISIT: HCPCS | Performed by: INTERNAL MEDICINE

## 2024-07-09 PROCEDURE — 1123F ACP DISCUSS/DSCN MKR DOCD: CPT | Performed by: INTERNAL MEDICINE

## 2024-07-09 PROCEDURE — 1157F ADVNC CARE PLAN IN RCRD: CPT | Performed by: INTERNAL MEDICINE

## 2024-07-09 PROCEDURE — 1126F AMNT PAIN NOTED NONE PRSNT: CPT | Performed by: INTERNAL MEDICINE

## 2024-07-09 PROCEDURE — 1170F FXNL STATUS ASSESSED: CPT | Performed by: INTERNAL MEDICINE

## 2024-07-09 PROCEDURE — 99214 OFFICE O/P EST MOD 30 MIN: CPT | Performed by: INTERNAL MEDICINE

## 2024-07-09 SDOH — ECONOMIC STABILITY: FOOD INSECURITY: WITHIN THE PAST 12 MONTHS, THE FOOD YOU BOUGHT JUST DIDN'T LAST AND YOU DIDN'T HAVE MONEY TO GET MORE.: NEVER TRUE

## 2024-07-09 SDOH — ECONOMIC STABILITY: INCOME INSECURITY: IN THE LAST 12 MONTHS, WAS THERE A TIME WHEN YOU WERE NOT ABLE TO PAY THE MORTGAGE OR RENT ON TIME?: NO

## 2024-07-09 SDOH — ECONOMIC STABILITY: GENERAL
WHICH OF THE FOLLOWING WOULD YOU LIKE TO GET CONNECTED TO IN ORDER TO RECEIVE A DISCOUNT OR FOR FREE? (CHOOSE ALL THAT APPLY): PATIENT DECLINED;NONE OF THESE

## 2024-07-09 SDOH — ECONOMIC STABILITY: GENERAL
WHICH OF THE FOLLOWING DO YOU KNOW HOW TO USE AND HAVE ACCESS TO EVERY DAY? (CHOOSE ALL THAT APPLY): SMARTPHONE WITH CELLULAR DATA PLAN;DESKTOP COMPUTER, LAPTOP COMPUTER, OR TABLET WITH BROADBAND INTERNET CONNECTION

## 2024-07-09 SDOH — ECONOMIC STABILITY: FOOD INSECURITY: WITHIN THE PAST 12 MONTHS, YOU WORRIED THAT YOUR FOOD WOULD RUN OUT BEFORE YOU GOT MONEY TO BUY MORE.: NEVER TRUE

## 2024-07-09 SDOH — HEALTH STABILITY: PHYSICAL HEALTH: ON AVERAGE, HOW MANY MINUTES DO YOU ENGAGE IN EXERCISE AT THIS LEVEL?: 60 MIN

## 2024-07-09 SDOH — HEALTH STABILITY: PHYSICAL HEALTH: ON AVERAGE, HOW MANY DAYS PER WEEK DO YOU ENGAGE IN MODERATE TO STRENUOUS EXERCISE (LIKE A BRISK WALK)?: 7 DAYS

## 2024-07-09 ASSESSMENT — SOCIAL DETERMINANTS OF HEALTH (SDOH)
WITHIN THE LAST YEAR, HAVE YOU BEEN AFRAID OF YOUR PARTNER OR EX-PARTNER?: NO
DO YOU BELONG TO ANY CLUBS OR ORGANIZATIONS SUCH AS CHURCH GROUPS UNIONS, FRATERNAL OR ATHLETIC GROUPS, OR SCHOOL GROUPS?: NO
IN A TYPICAL WEEK, HOW MANY TIMES DO YOU TALK ON THE PHONE WITH FAMILY, FRIENDS, OR NEIGHBORS?: MORE THAN THREE TIMES A WEEK
IN THE PAST 12 MONTHS, HAS THE ELECTRIC, GAS, OIL, OR WATER COMPANY THREATENED TO SHUT OFF SERVICE IN YOUR HOME?: NO
WITHIN THE LAST YEAR, HAVE YOU BEEN HUMILIATED OR EMOTIONALLY ABUSED IN OTHER WAYS BY YOUR PARTNER OR EX-PARTNER?: NO
HOW OFTEN DO YOU GET TOGETHER WITH FRIENDS OR RELATIVES?: THREE TIMES A WEEK
HOW HARD IS IT FOR YOU TO PAY FOR THE VERY BASICS LIKE FOOD, HOUSING, MEDICAL CARE, AND HEATING?: NOT HARD AT ALL
WITHIN THE LAST YEAR, HAVE YOU BEEN KICKED, HIT, SLAPPED, OR OTHERWISE PHYSICALLY HURT BY YOUR PARTNER OR EX-PARTNER?: NO
HOW OFTEN DO YOU ATTENT MEETINGS OF THE CLUB OR ORGANIZATION YOU BELONG TO?: NEVER
WITHIN THE LAST YEAR, HAVE TO BEEN RAPED OR FORCED TO HAVE ANY KIND OF SEXUAL ACTIVITY BY YOUR PARTNER OR EX-PARTNER?: NO
HOW OFTEN DO YOU ATTEND CHURCH OR RELIGIOUS SERVICES?: NEVER

## 2024-07-09 ASSESSMENT — ACTIVITIES OF DAILY LIVING (ADL)
HEARING - LEFT EAR: HEARING AID
ADEQUATE_TO_COMPLETE_ADL: YES
MANAGING FINANCES: INDEPENDENT
TAKING MEDICATION: INDEPENDENT
PILL BOX USED: YES
EATING: INDEPENDENT
JUDGMENT_ADEQUATE_SAFELY_COMPLETE_DAILY_ACTIVITIES: YES
STIL DRIVING: YES
DOING HOUSEWORK: INDEPENDENT
BATHING: INDEPENDENT
USING TRANSPORTATION: INDEPENDENT
GROOMING: INDEPENDENT
PATIENT'S MEMORY ADEQUATE TO SAFELY COMPLETE DAILY ACTIVITIES?: YES
DRESSING YOURSELF: INDEPENDENT
WALKS IN HOME: INDEPENDENT
ASSISTIVE_DEVICE: HEARING AID - LEFT;EYEGLASSES
HEARING - RIGHT EAR: HEARING AID
PREPARING MEALS: INDEPENDENT
USING TELEPHONE: INDEPENDENT
FEEDING YOURSELF: INDEPENDENT
GROCERY SHOPPING: INDEPENDENT
NEEDS ASSISTANCE WITH FOOD: INDEPENDENT
TOILETING: INDEPENDENT

## 2024-07-09 ASSESSMENT — ANXIETY QUESTIONNAIRES
1. FEELING NERVOUS, ANXIOUS, OR ON EDGE: NOT AT ALL
5. BEING SO RESTLESS THAT IT IS HARD TO SIT STILL: NOT AT ALL
6. BECOMING EASILY ANNOYED OR IRRITABLE: NOT AT ALL
IF YOU CHECKED OFF ANY PROBLEMS ON THIS QUESTIONNAIRE, HOW DIFFICULT HAVE THESE PROBLEMS MADE IT FOR YOU TO DO YOUR WORK, TAKE CARE OF THINGS AT HOME, OR GET ALONG WITH OTHER PEOPLE: NOT DIFFICULT AT ALL
GAD7 TOTAL SCORE: 1
4. TROUBLE RELAXING: SEVERAL DAYS
2. NOT BEING ABLE TO STOP OR CONTROL WORRYING: NOT AT ALL
7. FEELING AFRAID AS IF SOMETHING AWFUL MIGHT HAPPEN: NOT AT ALL
3. WORRYING TOO MUCH ABOUT DIFFERENT THINGS: NOT AT ALL

## 2024-07-09 ASSESSMENT — PATIENT HEALTH QUESTIONNAIRE - PHQ9
SUM OF ALL RESPONSES TO PHQ9 QUESTIONS 1 AND 2: 0
2. FEELING DOWN, DEPRESSED OR HOPELESS: NOT AT ALL
1. LITTLE INTEREST OR PLEASURE IN DOING THINGS: NOT AT ALL

## 2024-07-09 ASSESSMENT — LIFESTYLE VARIABLES
AUDIT-C TOTAL SCORE: 0
HOW OFTEN DO YOU HAVE SIX OR MORE DRINKS ON ONE OCCASION: NEVER
HOW OFTEN DO YOU HAVE A DRINK CONTAINING ALCOHOL: NEVER
SKIP TO QUESTIONS 9-10: 1
HOW MANY STANDARD DRINKS CONTAINING ALCOHOL DO YOU HAVE ON A TYPICAL DAY: PATIENT DOES NOT DRINK

## 2024-07-09 ASSESSMENT — PAIN SCALES - GENERAL: PAINLEVEL: 0-NO PAIN

## 2024-07-09 NOTE — PATIENT INSTRUCTIONS
Dr Kolton Monzon follow up for kidney cyst September [phone].    Mammogram between end of July and late fall 2024, schedule.    Bone density in 2 years.  Vaccines: fall:  Covid booster, flu shot and consider RSV vaccine. Your others are up to date.    Fasting blood test and urine test this summer or by September:  Instructions for obtaining lab tests:   You do not need a paper requisition when obtaining tests at a  facility. No appointment is needed for lab tests.  For fasting blood tests:  Please do not consume calories for 10-12 hours prior to this blood test. It is ok to drink water, you should be hydrated.  Please do not take vitamins, supplements or thyroid medication before your blood is drawn this day.   Most lab results should be available for your review on the  My Chart portal within 48 hours. Please contact the office if you have not received results within 2 weeks of obtaining the test.    Consider adding ensure or boost one can if not able to eat due to oral surgery.    Follow up one year annual and check up and sooner if needed.

## 2024-08-09 ENCOUNTER — LAB (OUTPATIENT)
Dept: LAB | Facility: LAB | Age: 84
End: 2024-08-09
Payer: COMMERCIAL

## 2024-08-09 DIAGNOSIS — Z79.899 ENCOUNTER FOR MONITORING STATIN THERAPY: ICD-10-CM

## 2024-08-09 DIAGNOSIS — E03.9 HYPOTHYROIDISM, UNSPECIFIED TYPE: ICD-10-CM

## 2024-08-09 DIAGNOSIS — E78.00 HYPERCHOLESTEROLEMIA: ICD-10-CM

## 2024-08-09 DIAGNOSIS — Z51.81 ENCOUNTER FOR MONITORING STATIN THERAPY: ICD-10-CM

## 2024-08-09 DIAGNOSIS — R35.0 URINARY FREQUENCY: ICD-10-CM

## 2024-08-09 LAB
ALBUMIN SERPL BCP-MCNC: 4.2 G/DL (ref 3.4–5)
ALP SERPL-CCNC: 38 U/L (ref 33–136)
ALT SERPL W P-5'-P-CCNC: 10 U/L (ref 7–45)
ANION GAP SERPL CALC-SCNC: 14 MMOL/L (ref 10–20)
APPEARANCE UR: ABNORMAL
AST SERPL W P-5'-P-CCNC: 19 U/L (ref 9–39)
BACTERIA #/AREA URNS AUTO: ABNORMAL /HPF
BILIRUB SERPL-MCNC: 0.7 MG/DL (ref 0–1.2)
BILIRUB UR STRIP.AUTO-MCNC: NEGATIVE MG/DL
BUN SERPL-MCNC: 19 MG/DL (ref 6–23)
CALCIUM SERPL-MCNC: 9.9 MG/DL (ref 8.6–10.6)
CHLORIDE SERPL-SCNC: 103 MMOL/L (ref 98–107)
CHOLEST SERPL-MCNC: 179 MG/DL (ref 0–199)
CHOLESTEROL/HDL RATIO: 2.6
CK SERPL-CCNC: 60 U/L (ref 0–215)
CO2 SERPL-SCNC: 25 MMOL/L (ref 21–32)
COLOR UR: ABNORMAL
CREAT SERPL-MCNC: 0.83 MG/DL (ref 0.5–1.05)
EGFRCR SERPLBLD CKD-EPI 2021: 70 ML/MIN/1.73M*2
GLUCOSE SERPL-MCNC: 109 MG/DL (ref 74–99)
GLUCOSE UR STRIP.AUTO-MCNC: NORMAL MG/DL
HDLC SERPL-MCNC: 67.9 MG/DL
KETONES UR STRIP.AUTO-MCNC: NEGATIVE MG/DL
LDLC SERPL CALC-MCNC: 92 MG/DL
LDLC SERPL DIRECT ASSAY-MCNC: 99 MG/DL (ref 0–129)
LEUKOCYTE ESTERASE UR QL STRIP.AUTO: ABNORMAL
NITRITE UR QL STRIP.AUTO: NEGATIVE
NON HDL CHOLESTEROL: 111 MG/DL (ref 0–149)
PH UR STRIP.AUTO: 7 [PH]
POTASSIUM SERPL-SCNC: 4.4 MMOL/L (ref 3.5–5.3)
PROT SERPL-MCNC: 7.4 G/DL (ref 6.4–8.2)
PROT UR STRIP.AUTO-MCNC: NEGATIVE MG/DL
RBC # UR STRIP.AUTO: NEGATIVE /UL
RBC #/AREA URNS AUTO: ABNORMAL /HPF
SODIUM SERPL-SCNC: 138 MMOL/L (ref 136–145)
SP GR UR STRIP.AUTO: 1.01
SQUAMOUS #/AREA URNS AUTO: ABNORMAL /HPF
T4 FREE SERPL-MCNC: 1 NG/DL (ref 0.78–1.48)
TRIGL SERPL-MCNC: 98 MG/DL (ref 0–149)
TSH SERPL-ACNC: 4 MIU/L (ref 0.44–3.98)
UROBILINOGEN UR STRIP.AUTO-MCNC: NORMAL MG/DL
VLDL: 20 MG/DL (ref 0–40)
WBC #/AREA URNS AUTO: ABNORMAL /HPF

## 2024-08-09 PROCEDURE — 80053 COMPREHEN METABOLIC PANEL: CPT

## 2024-08-09 PROCEDURE — 83721 ASSAY OF BLOOD LIPOPROTEIN: CPT

## 2024-08-09 PROCEDURE — 84443 ASSAY THYROID STIM HORMONE: CPT

## 2024-08-09 PROCEDURE — 81001 URINALYSIS AUTO W/SCOPE: CPT

## 2024-08-09 PROCEDURE — 84439 ASSAY OF FREE THYROXINE: CPT

## 2024-08-09 PROCEDURE — 82550 ASSAY OF CK (CPK): CPT

## 2024-08-09 PROCEDURE — 80061 LIPID PANEL: CPT

## 2024-11-06 ENCOUNTER — HOSPITAL ENCOUNTER (OUTPATIENT)
Dept: RADIOLOGY | Facility: CLINIC | Age: 84
Discharge: HOME | End: 2024-11-06
Payer: MEDICARE

## 2024-11-06 VITALS — BODY MASS INDEX: 21.97 KG/M2 | HEIGHT: 63 IN | WEIGHT: 124 LBS

## 2024-11-06 DIAGNOSIS — Z12.31 ENCOUNTER FOR SCREENING MAMMOGRAM FOR BREAST CANCER: ICD-10-CM

## 2024-11-06 PROCEDURE — 77067 SCR MAMMO BI INCL CAD: CPT

## 2024-11-06 PROCEDURE — 77063 BREAST TOMOSYNTHESIS BI: CPT | Performed by: RADIOLOGY

## 2024-11-06 PROCEDURE — 77067 SCR MAMMO BI INCL CAD: CPT | Performed by: RADIOLOGY

## 2025-01-07 NOTE — PROGRESS NOTES
Patient ID: Ange Delaney is a 84 y.o. female who presents for Follow-up (Pt here for follow up and review. Pt has lost 10 pounds, but has been going through dental implants, which has made it hard to eat well, but this is much better now. Denies any other problems or concerns at this time)    LAST VISIT PLAN FROM: 07/09/2024:  Instructions  Dr Kolton Monzon follow up for kidney cyst September [phone].  Mammogram between end of July and late fall 2024, schedule.  Bone density in 2 years.  Vaccines: fall:  Covid booster, flu shot and consider RSV vaccine. Your others are up to date.  Fasting blood test and urine test this summer or by September:  Instructions for obtaining lab tests:   You do not need a paper requisition when obtaining tests at a  facility. No appointment is needed for lab tests.  For fasting blood tests:  Please do not consume calories for 10-12 hours prior to this blood test. It is ok to drink water, you should be hydrated.  Please do not take vitamins, supplements or thyroid medication before your blood is drawn this day.   Most lab results should be available for your review on the  My Chart portal within 48 hours. Please contact the office if you have not received results within 2 weeks of obtaining the test.  Consider adding ensure or boost one can if not able to eat due to oral surgery.  Follow up one year annual and check up and sooner if needed.      END LAST VISIT PLAN  -------------------------------------------  HPI  Patient is an 84-year-old female who presents today for follow up.    Hyperlipidemia:  No myalgias, nausea, abdominal pain.  Meds: Taking regularly, no adverse effect.  Patient continues on pravastatin 40 mg daily.  Labs: Last LDL direct was 99 mg/dL on 08/09/2024.  LDL goal: <100 mg/dL, unless patient has plaque, than goal would be less than 70 mg/dL.    Hypothyroidism:  Last TSH was 4.00 with T4 at 1.00 on 08/09/2024.  Patient is not currently on thyroid medication.  Order placed for repeat blood work.    Weight Loss:  Patient has lost 10 pounds since last visit.  She states she had 3 dental implants by Dr. Haley, and had a long recovery where it was difficult to eat.  Patient was 124 pounds in 11/2024, 114 pounds today and 114 pounds last March.  She denies abdominal pain, bowel or urinary concerns.  Order placed for blood work.    Of note, patient has a history of candida esophagitis in the past.    Word Finding Difficulty:  Patient reports having word finding difficulty if stressed or at the doctor's office. She had difficulty coming up with the word puzzle today in office.  She could describe what it was but could not recall the name.  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  Her daughter has not noticed any memory loss.  Recommended doing crosswords daily where she has to find the words and arithmetic daily which will help with memory decline.    Of note, she had a CT Head on 07/12/2023 which showed mild-to-moderate brain parenchymal volume loss and mild nonspecific white matter changes noted within cerebral hemispheres bilaterally which while nonspecific, given the patient's age, likely represent sequelae of small-vessel ischemic change.     Patient has not had a recent carotid doppler; last 10/14/2021, and has a history of carotid atherosclerosis.  Order placed.    She underwent MMSE in 07/2023 which was 28/30; again in 11/2023 which was 30/30.  I did not do MMSE today.  Will do at Wellness Visit in July.    Patient drove today and did not get lost and states that she can recall people.    Orders placed for blood work as required.    We discussed recommended vaccines including RSV, Covid booster and influenza vaccine.  Patient received her influenza vaccine today in office.    Orders placed for blood work as required.    Follow up 07/14/2025.    History of bosniak 2 cyst and recurrent utis-no utis' is on vaginal estrogen from   [facility]. Pt declined to follow up with Dr Monzon at one year, due sept 2024 (see notes from our prior visit)  Bosnniak cyst had decreased in size at last evaluation.Denies urinary complaints today.    Review of Systems  See ROS in HPI    Current Outpatient Medications   Medication Instructions    ascorbic acid (Vitamin C) 500 mg tablet 1 tablet, Daily    chencho cit-mag-D3-Zn--man-bor (Citracal-D3 Plus Magnesium) 250- mg-mg-unit tablet Take by mouth.    cholecalciferol (VITAMIN D-3) 50 mcg, Daily    cyanocobalamin (Vitamin B-12) 1,000 mcg tablet 1 tablet, Daily    estradiol (ESTRACE) 2 g, Daily    pravastatin (PRAVACHOL) 40 mg, oral, Daily     Allergies   Allergen Reactions    Alendronate Unknown    Doxycycline Unknown    Ezetimibe-Simvastatin Myalgia     Objective    The following test results have been reviewed:  Latest Complete Lab Results:  CBC:   Lab Results   Component Value Date    WBC 6.1 03/20/2024    RBC 4.76 03/20/2024    HGB 14.9 03/20/2024    HCT 46.2 (H) 03/20/2024    MCV 97 03/20/2024    MCH 31.3 03/20/2024    MCHC 32.3 03/20/2024     03/20/2024     CMP:  Lab Results   Component Value Date    GLUCOSE 109 (H) 08/09/2024     08/09/2024    K 4.4 08/09/2024     08/09/2024    CO2 25 08/09/2024    ANIONGAP 14 08/09/2024    BUN 19 08/09/2024    CREATININE 0.83 08/09/2024    GFRF 71 09/11/2023    CALCIUM 9.9 08/09/2024    ALBUMIN 4.2 08/09/2024    ALKPHOS 38 08/09/2024    PROT 7.4 08/09/2024    AST 19 08/09/2024    BILITOT 0.7 08/09/2024    ALT 10 08/09/2024      Lipid:   Lab Results   Component Value Date    CHOL 179 08/09/2024    HDL 67.9 08/09/2024    CHHDL 2.6 08/09/2024    LDLF 144 (H) 09/11/2023    VLDL 20 08/09/2024    TRIG 98 08/09/2024     LDL Direct:  Lab Results   Component Value Date    LDLDIRECT 99 08/09/2024      TSH:   Lab Results   Component Value Date    TSH 4.00 (H) 08/09/2024      B12:  Lab Results   Component Value Date    DLCNXDKU86 1,232 (H) 03/20/2024     Vitamin  "D:  Lab Results   Component Value Date    VITD25 53 09/11/2023     Interpreted By:  OSIRIS MAIN MD and KEVIN MONTANA DO  MRN: [id number]  Patient Name: MARY WEBBER     STUDY:  CT HEAD WO CONTRAST;  7/12/2023 9:37 am     INDICATION:  word finding difficulty x 6 months, carotid plaque.     COMPARISON:  None.     ACCESSION NUMBER(S):  [id number]     ORDERING CLINICIAN:  CASIE LOPEZ     TECHNIQUE:  Axial CT images images of the head were obtained without intravenous  contrast administration.     FINDINGS:  There is mild-to-moderate brain parenchymal volume loss.     There are mild nonspecific white matter changes noted within cerebral  hemispheres bilaterally which while nonspecific, given the patient's  age, likely represent sequelae of small-vessel ischemic change.     No hyperdense acute intracranial hemorrhage is noted.     There is no midline shift.     Atherosclerotic calcifications are noted along the carotid siphons.     There is partial opacification of the left sphenoid sinus. The  remaining visualized paranasal sinuses and mastoid air cells are  clear.     No acute fracture is noted.      Impression   There is mild-to-moderate brain parenchymal volume loss.     There are mild nonspecific white matter changes noted within cerebral  hemispheres bilaterally which while nonspecific, given the patient's  age, likely represent sequelae of small-vessel ischemic change.          Physical Exam  Vitals:      8/3/2023     8:47 AM 8/7/2023     2:41 PM 11/6/2023     8:18 AM 3/20/2024     8:21 AM 7/9/2024    10:55 AM 11/6/2024     8:42 AM 1/13/2025     9:53 AM   Vitals   Systolic 160 183 124 132 124  134   Diastolic 73 83 72 72 64  72   BP Location   Left arm Left arm      Heart Rate 68 77 64 60 68  64   Temp 36.4 °C (97.5 °F)         Resp 16 17 18 18   18   Height  1.619 m (5' 3.75\") 1.6 m (5' 3\") 1.6 m (5' 3\") 1.613 m (5' 3.5\") 1.6 m (5' 3\") 1.6 m (5' 3\")   Weight (lb) 119.93 119.5 118 114 113.6 124 114   BMI 21.24 " kg/m2 20.67 kg/m2 20.9 kg/m2 20.19 kg/m2 19.81 kg/m2 21.97 kg/m2 20.19 kg/m2   BSA (m2) 1.56 m2 1.56 m2 1.54 m2 1.52 m2 1.52 m2 1.58 m2 1.52 m2   Visit Report   Report Report Report  Report     Patient looks well and is in no obvious distress.  HEENT:   Normocephalic, no facial asymmetry  Eyes: Sclera and conjunctiva are clear.  Neck: No adenopathy cervical (Ant/post/lat), no Supraclavicular nodes.   Thyroid normal.  Carotid pulses normal, no carotid bruits.  Lungs : RR normal. Clear to auscultation anterior, posterior and lateral. No rales, wheezes rhonchi or rubs. Good air exchange.  Heart: RRR. No Murmur, gallop, click or rub.  Abdomen: Bowel sounds normal, No bruits. No pulsatile mass. No hepatosplenomegaly, masses or tenderness. Soft, no guarding.  Vascular:  Posterior tibialis pulses within normal limits, bilaterally.   Extremities: No upper extremity edema. No lower extremity edema.   Musculoskeletal: No synovitis of joints seen. No new deformity.  Neuro: CN 2-12 intact. Alert, appropriate.  Ambulates independently.  No gross motor deficit.   No tremors.  Psych: mildly anxious  mood and affect. Always pleasant. 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 (Gr grandchildren age 6 m 2 year, 4 yr).  Skin: No rash, bruising petechiae or jaundice.    Ange was seen today for follow-up.  Diagnoses and all orders for this visit:  Elevated LDL cholesterol level (Primary)  TSH elevation  -     TSH with reflex to Free T4 if abnormal; Future  -     CBC and Auto Differential; Future  Abnormal weight loss  -     TSH with reflex to Free T4 if abnormal; Future  -     CBC and Auto Differential; Future  -     Comprehensive metabolic panel; Future  Abnormal thyroid function test  -     TSH with reflex to Free T4 if abnormal; Future  Carotid atherosclerosis, unspecified laterality  -      Vascular US Carotid Artery Duplex Bilateral; Future  Word finding difficulty  Comments:  30/30 mmse november 2023. ct head some volume loss july 2023.  Orders:  -     Vascular US Carotid Artery Duplex Bilateral; Future  Influenza vaccine administered  Need for influenza vaccination  -     Flu vaccine, trivalent, preservative free, HIGH-DOSE, age 65y+ (Fluzone)  Immunization counseling  Anxiety         See patient instructions in wrap up plan, orders and comments for treatment plan.  Patient Instructions:  Recommend Easy crossword puzzles and also add some arithmetic skills every day : work problems 2 digit or 3 digit addition and subtraction-there are workbooks for this you can find on line.    Blood test today to be sure nothing odd with the recent weight loss and to recheck the borderline thyroid test.    Also go to the radiology window while you are at the lab and schedule a carotid artery ultrasound    Follow up for your wellness visit in the summer and you will need a fasting blood test the week before that visit. Those orders will be placed at a later date.    Flu shot given today.  Please get an RSV vaccine at the pharmacy-the RSV is a one time only vaccine, it is not annual.  -------------------  I am the Internal Medicine physician providing ongoing chronic medical care for this patient, which is managed during and in between office visits.    Scribe Attestation  By signing my name below, INinfa, Scranu   attest that this documentation has been prepared under the direction and in the presence of Jadyn Nunez MD.

## 2025-01-13 ENCOUNTER — APPOINTMENT (OUTPATIENT)
Dept: PRIMARY CARE | Facility: CLINIC | Age: 85
End: 2025-01-13
Payer: COMMERCIAL

## 2025-01-13 ENCOUNTER — LAB (OUTPATIENT)
Dept: LAB | Facility: LAB | Age: 85
End: 2025-01-13
Payer: MEDICARE

## 2025-01-13 VITALS
SYSTOLIC BLOOD PRESSURE: 134 MMHG | BODY MASS INDEX: 20.2 KG/M2 | HEIGHT: 63 IN | DIASTOLIC BLOOD PRESSURE: 72 MMHG | HEART RATE: 64 BPM | RESPIRATION RATE: 18 BRPM | WEIGHT: 114 LBS

## 2025-01-13 DIAGNOSIS — R63.4 ABNORMAL WEIGHT LOSS: ICD-10-CM

## 2025-01-13 DIAGNOSIS — Z71.85 IMMUNIZATION COUNSELING: ICD-10-CM

## 2025-01-13 DIAGNOSIS — R47.89 WORD FINDING DIFFICULTY: ICD-10-CM

## 2025-01-13 DIAGNOSIS — E78.00 ELEVATED LDL CHOLESTEROL LEVEL: Primary | ICD-10-CM

## 2025-01-13 DIAGNOSIS — R94.6 ABNORMAL THYROID FUNCTION TEST: ICD-10-CM

## 2025-01-13 DIAGNOSIS — F41.9 ANXIETY: ICD-10-CM

## 2025-01-13 DIAGNOSIS — I65.29 CAROTID ATHEROSCLEROSIS, UNSPECIFIED LATERALITY: ICD-10-CM

## 2025-01-13 DIAGNOSIS — Z23 INFLUENZA VACCINE ADMINISTERED: ICD-10-CM

## 2025-01-13 DIAGNOSIS — Z23 NEED FOR INFLUENZA VACCINATION: ICD-10-CM

## 2025-01-13 DIAGNOSIS — R79.89 TSH ELEVATION: ICD-10-CM

## 2025-01-13 PROCEDURE — 84443 ASSAY THYROID STIM HORMONE: CPT

## 2025-01-13 PROCEDURE — 1036F TOBACCO NON-USER: CPT | Performed by: INTERNAL MEDICINE

## 2025-01-13 PROCEDURE — 1160F RVW MEDS BY RX/DR IN RCRD: CPT | Performed by: INTERNAL MEDICINE

## 2025-01-13 PROCEDURE — G0008 ADMIN INFLUENZA VIRUS VAC: HCPCS | Performed by: INTERNAL MEDICINE

## 2025-01-13 PROCEDURE — 99214 OFFICE O/P EST MOD 30 MIN: CPT | Performed by: INTERNAL MEDICINE

## 2025-01-13 PROCEDURE — 1157F ADVNC CARE PLAN IN RCRD: CPT | Performed by: INTERNAL MEDICINE

## 2025-01-13 PROCEDURE — 1159F MED LIST DOCD IN RCRD: CPT | Performed by: INTERNAL MEDICINE

## 2025-01-13 PROCEDURE — 90662 IIV NO PRSV INCREASED AG IM: CPT | Performed by: INTERNAL MEDICINE

## 2025-01-13 PROCEDURE — 1158F ADVNC CARE PLAN TLK DOCD: CPT | Performed by: INTERNAL MEDICINE

## 2025-01-13 PROCEDURE — 1126F AMNT PAIN NOTED NONE PRSNT: CPT | Performed by: INTERNAL MEDICINE

## 2025-01-13 PROCEDURE — 80053 COMPREHEN METABOLIC PANEL: CPT

## 2025-01-13 PROCEDURE — 1123F ACP DISCUSS/DSCN MKR DOCD: CPT | Performed by: INTERNAL MEDICINE

## 2025-01-13 PROCEDURE — G2211 COMPLEX E/M VISIT ADD ON: HCPCS | Performed by: INTERNAL MEDICINE

## 2025-01-13 PROCEDURE — 85025 COMPLETE CBC W/AUTO DIFF WBC: CPT

## 2025-01-13 ASSESSMENT — PAIN SCALES - GENERAL: PAINLEVEL_OUTOF10: 0-NO PAIN

## 2025-01-13 ASSESSMENT — PATIENT HEALTH QUESTIONNAIRE - PHQ9
1. LITTLE INTEREST OR PLEASURE IN DOING THINGS: NOT AT ALL
2. FEELING DOWN, DEPRESSED OR HOPELESS: NOT AT ALL
SUM OF ALL RESPONSES TO PHQ9 QUESTIONS 1 AND 2: 0

## 2025-01-13 NOTE — PATIENT INSTRUCTIONS
Recommend Easy crossword puzzles and also add some arithmetic skills every day : work problems 2 digit or 3 digit addition and subtraction-there are workbooks for this you can find on line.    Blood test today to be sure nothing odd with the recent weight loss and to recheck the borderline thyroid test.    Also go to the radiology window while you are at the lab and schedule a carotid artery ultrasound    Follow up for your wellness visit in the summer and you will need a fasting blood test the week before that visit. Those orders will be placed at a later date.    Flu shot given today.  Please get an RSV vaccine at the pharmacy-the RSV is a one time only vaccine, it is not annual.

## 2025-01-14 LAB
ALBUMIN SERPL BCP-MCNC: 4.9 G/DL (ref 3.4–5)
ALP SERPL-CCNC: 41 U/L (ref 33–136)
ALT SERPL W P-5'-P-CCNC: 16 U/L (ref 7–45)
ANION GAP SERPL CALC-SCNC: 14 MMOL/L (ref 10–20)
AST SERPL W P-5'-P-CCNC: 28 U/L (ref 9–39)
BASOPHILS # BLD AUTO: 0.04 X10*3/UL (ref 0–0.1)
BASOPHILS NFR BLD AUTO: 0.7 %
BILIRUB SERPL-MCNC: 0.4 MG/DL (ref 0–1.2)
BUN SERPL-MCNC: 16 MG/DL (ref 6–23)
CALCIUM SERPL-MCNC: 10.4 MG/DL (ref 8.6–10.6)
CHLORIDE SERPL-SCNC: 102 MMOL/L (ref 98–107)
CO2 SERPL-SCNC: 28 MMOL/L (ref 21–32)
CREAT SERPL-MCNC: 0.89 MG/DL (ref 0.5–1.05)
EGFRCR SERPLBLD CKD-EPI 2021: 64 ML/MIN/1.73M*2
EOSINOPHIL # BLD AUTO: 0.12 X10*3/UL (ref 0–0.4)
EOSINOPHIL NFR BLD AUTO: 2.2 %
ERYTHROCYTE [DISTWIDTH] IN BLOOD BY AUTOMATED COUNT: 13.6 % (ref 11.5–14.5)
GLUCOSE SERPL-MCNC: 102 MG/DL (ref 74–99)
HCT VFR BLD AUTO: 43.3 % (ref 36–46)
HGB BLD-MCNC: 14.4 G/DL (ref 12–16)
IMM GRANULOCYTES # BLD AUTO: 0.01 X10*3/UL (ref 0–0.5)
IMM GRANULOCYTES NFR BLD AUTO: 0.2 % (ref 0–0.9)
LYMPHOCYTES # BLD AUTO: 1.89 X10*3/UL (ref 0.8–3)
LYMPHOCYTES NFR BLD AUTO: 35.3 %
MCH RBC QN AUTO: 32 PG (ref 26–34)
MCHC RBC AUTO-ENTMCNC: 33.3 G/DL (ref 32–36)
MCV RBC AUTO: 96 FL (ref 80–100)
MONOCYTES # BLD AUTO: 0.69 X10*3/UL (ref 0.05–0.8)
MONOCYTES NFR BLD AUTO: 12.9 %
NEUTROPHILS # BLD AUTO: 2.6 X10*3/UL (ref 1.6–5.5)
NEUTROPHILS NFR BLD AUTO: 48.7 %
NRBC BLD-RTO: 0 /100 WBCS (ref 0–0)
PLATELET # BLD AUTO: 269 X10*3/UL (ref 150–450)
POTASSIUM SERPL-SCNC: 4.3 MMOL/L (ref 3.5–5.3)
PROT SERPL-MCNC: 8.1 G/DL (ref 6.4–8.2)
RBC # BLD AUTO: 4.5 X10*6/UL (ref 4–5.2)
SODIUM SERPL-SCNC: 140 MMOL/L (ref 136–145)
TSH SERPL-ACNC: 2.75 MIU/L (ref 0.44–3.98)
WBC # BLD AUTO: 5.4 X10*3/UL (ref 4.4–11.3)

## 2025-01-16 NOTE — RESULT ENCOUNTER NOTE
Please call the patient regarding her result. Her blood tests: blood counts (no anemia,), chemistries, and thyroid tests are good.
[id number] Detailed

## 2025-01-22 ENCOUNTER — HOSPITAL ENCOUNTER (OUTPATIENT)
Dept: VASCULAR MEDICINE | Facility: CLINIC | Age: 85
Discharge: HOME | End: 2025-01-22
Payer: MEDICARE

## 2025-01-22 DIAGNOSIS — I65.29 CAROTID ATHEROSCLEROSIS, UNSPECIFIED LATERALITY: ICD-10-CM

## 2025-01-22 DIAGNOSIS — R47.89 WORD FINDING DIFFICULTY: ICD-10-CM

## 2025-01-22 DIAGNOSIS — I65.23 OCCLUSION AND STENOSIS OF BILATERAL CAROTID ARTERIES: ICD-10-CM

## 2025-01-22 PROCEDURE — 93880 EXTRACRANIAL BILAT STUDY: CPT

## 2025-01-22 PROCEDURE — 93880 EXTRACRANIAL BILAT STUDY: CPT | Performed by: INTERNAL MEDICINE

## 2025-03-04 DIAGNOSIS — N95.2 VAGINAL ATROPHY: ICD-10-CM

## 2025-03-04 NOTE — TELEPHONE ENCOUNTER
----- Message from Jadyn Nunez sent at 3/3/2025  9:23 AM EST -----  Please call the patient regarding her result. Her carotid arteries did not show any worrisome blockage.she does have plaque.which we knew about and she is on medication for this (pravastatin). Will let her know if any other imaging is needed for some non specific changes in the vertebral artery evaluation. Send this back to me after contacting her. Thanks.

## 2025-03-04 NOTE — TELEPHONE ENCOUNTER
Spoke to daughter, her mom told her that this test came back fine? I gave her your recommendations. Daughter also wanted to add that she needs a refill of Estrace and it is okay to wait until Monday.

## 2025-03-09 RX ORDER — ESTRADIOL 0.1 MG/G
2 CREAM VAGINAL DAILY
Qty: 42.5 G | Refills: 1 | Status: SHIPPED | OUTPATIENT
Start: 2025-03-09

## 2025-03-12 DIAGNOSIS — E78.00 ELEVATED LDL CHOLESTEROL LEVEL: ICD-10-CM

## 2025-03-12 DIAGNOSIS — R53.82 CHRONIC FATIGUE: ICD-10-CM

## 2025-03-12 DIAGNOSIS — E78.00 ELEVATED LOW DENSITY LIPOPROTEIN (LDL) CHOLESTEROL LEVEL: ICD-10-CM

## 2025-03-12 DIAGNOSIS — E55.9 VITAMIN D DEFICIENCY: ICD-10-CM

## 2025-03-12 DIAGNOSIS — N39.0 RECURRENT UTI: ICD-10-CM

## 2025-03-12 DIAGNOSIS — E55.9 VITAMIN D INSUFFICIENCY: ICD-10-CM

## 2025-03-12 DIAGNOSIS — E03.9 HYPOTHYROIDISM, UNSPECIFIED TYPE: ICD-10-CM

## 2025-03-16 ENCOUNTER — OFFICE VISIT (OUTPATIENT)
Dept: URGENT CARE | Age: 85
End: 2025-03-16
Payer: MEDICARE

## 2025-03-16 ENCOUNTER — ANCILLARY PROCEDURE (OUTPATIENT)
Dept: URGENT CARE | Age: 85
End: 2025-03-16
Payer: MEDICARE

## 2025-03-16 VITALS
HEIGHT: 63 IN | OXYGEN SATURATION: 98 % | BODY MASS INDEX: 20.2 KG/M2 | DIASTOLIC BLOOD PRESSURE: 75 MMHG | SYSTOLIC BLOOD PRESSURE: 152 MMHG | WEIGHT: 114 LBS | TEMPERATURE: 97.5 F | HEART RATE: 94 BPM | RESPIRATION RATE: 18 BRPM

## 2025-03-16 DIAGNOSIS — R52 PAIN: ICD-10-CM

## 2025-03-16 DIAGNOSIS — S82.002A CLOSED NONDISPLACED FRACTURE OF LEFT PATELLA, UNSPECIFIED FRACTURE MORPHOLOGY, INITIAL ENCOUNTER: Primary | ICD-10-CM

## 2025-03-16 PROCEDURE — 73564 X-RAY EXAM KNEE 4 OR MORE: CPT | Mod: LEFT SIDE | Performed by: PHYSICIAN ASSISTANT

## 2025-03-16 ASSESSMENT — ENCOUNTER SYMPTOMS
ARTHRALGIAS: 1
JOINT SWELLING: 1

## 2025-03-16 ASSESSMENT — PAIN SCALES - GENERAL: PAINLEVEL_OUTOF10: 6

## 2025-03-16 NOTE — PROGRESS NOTES
Subjective   Patient ID: Ange Delaney is a 85 y.o. female. They present today with a chief complaint of Injury (LEFT knee trauma x 1 day ).    History of Present Illness    Injury      Patient presents to urgent care for complaints of left knee pain after falling yesterday.  Patient states she tripped and fell on the hardwood floor at her daughter's house.  Denies any loss of consciousness.  She reports pain in her left knee after falling.  She has been using a knee brace and icing as needed, reports it has been helping with her pain.  Past Medical History  Allergies as of 03/16/2025 - Reviewed 03/16/2025   Allergen Reaction Noted    Alendronate Unknown 06/20/2023    Doxycycline Unknown 06/20/2023    Ezetimibe-simvastatin Myalgia 06/20/2023       (Not in a hospital admission)       Past Medical History:   Diagnosis Date    Body mass index (BMI) 21.0-21.9, adult     BMI 21.0-21.9, adult    Body mass index (BMI) 21.0-21.9, adult 01/12/2022    Body mass index (BMI) of 21.0 to 21.9 in adult    Bowel habit changes 06/20/2023    Candida esophagitis (Multi) 06/20/2023    Contusion of scalp, sequela 09/13/2021    Contusion of scalp, sequela    Contusion of unspecified part of head, initial encounter 09/13/2021    Contusion of head    Early satiety 06/20/2023    Elevated blood-pressure reading, without diagnosis of hypertension 10/19/2019    Elevated blood pressure reading without diagnosis of hypertension    Encounter for immunization 04/09/2018    Need for Tdap vaccination    Epigastric pain 06/20/2023    Gout, unspecified 07/20/2018    Gout of left foot    Hydronephrosis, left 06/20/2023    Leukopenia 06/20/2023    Microscopic hematuria 06/20/2023    Other signs and symptoms in breast 08/18/2021    Abnormal breast exam    Other specified disorders of bone density and structure, other site 06/16/2020    Osteopenia of lumbar spine    Pain in left foot 07/20/2018    Left foot pain    Personal history of other drug therapy  01/30/2019    History of pneumococcal vaccination    Personal history of other specified conditions 06/03/2019    History of dysuria    Personal history of other specified conditions 08/18/2021    History of dysuria    Personal history of other specified conditions 04/03/2022    History of dysuria    Personal history of other specified conditions     History of dysuria    Personal history of urinary (tract) infections 12/22/2017    History of urinary tract infection    Personal history of urinary (tract) infections 11/10/2018    History of urinary tract infection    Trapezius muscle spasm 06/20/2023    Unspecified lump in unspecified breast 08/18/2021    Breast nodule    Unspecified symptoms and signs involving the genitourinary system     UTI symptoms    Unspecified symptoms and signs involving the genitourinary system 10/19/2019    Symptoms involving urinary system    Unspecified symptoms and signs involving the genitourinary system     UTI symptoms    Urinary tract infection, site not specified 08/18/2021    Acute UTI    Urinary tract infection, site not specified 05/30/2019    E. coli UTI    White coat syndrome without hypertension 06/20/2023    Word finding difficulty 07/12/2023       Past Surgical History:   Procedure Laterality Date    ABDOMINAL SURGERY  01/01/2001    to fix gi bleed, was between 2000 and 2004.    CATARACT EXTRACTION W/ INTRAOCULAR LENS IMPLANT Bilateral 01/01/2024    MOUTH SURGERY  04/01/2024    implants-on going        reports that she has never smoked. She has never been exposed to tobacco smoke. She has never used smokeless tobacco. She reports that she does not drink alcohol and does not use drugs.    Review of Systems  Review of Systems   Musculoskeletal:  Positive for arthralgias and joint swelling.                                  Objective    Vitals:    03/16/25 0849   BP: 152/75   BP Location: Right arm   Patient Position: Sitting   BP Cuff Size: Adult   Pulse: 94   Resp: 18   Temp:  "36.4 °C (97.5 °F)   TempSrc: Temporal   SpO2: 98%   Weight: 51.7 kg (114 lb)   Height: 1.6 m (5' 3\")     No LMP recorded. Patient is postmenopausal.    Physical Exam  Vitals reviewed.   Constitutional:       Appearance: Normal appearance.   Musculoskeletal:      Left knee: Swelling present. No deformity. Decreased range of motion (decreased extension of left knee). Tenderness present over the patellar tendon. Normal pulse.   Neurological:      Mental Status: She is alert.         Procedures    Point of Care Test & Imaging Results from this visit  No results found for this visit on 03/16/25.   No results found.    Diagnostic study results (if any) were reviewed by FARIBA Echevarria.    Assessment/Plan   Allergies, medications, history, and pertinent labs/EKGs/Imaging reviewed by FARIBA Echevarria.     Medical Decision Making  === 03/16/25 ===    XR KNEE LEFT 4+ VIEWS    - Impression -  Nondisplaced patellar fracture with probable patellar tendinosis.    MACRO  None    Signed by: Radu Parikh 3/16/2025 9:13 AM  Dictation workstation:   [id number]       Patient was placed in a knee immobilizer.  Ortho referral was placed patient was told to follow-up with Ortho.  May take Tylenol or ibuprofen as needed for pain.  When resting try to keep leg elevated and you may use ice as needed.    At time of discharge patient was clinically well-appearing and HDS for outpatient management. The patient and/or family was educated regarding diagnosis, supportive care, OTC and Rx medications. The patient and/or family was given the opportunity to ask questions prior to discharge.  They verbalized understanding of my discussion of the plans for treatment, expected course, indications to return to  or seek further evaluation in ED, and the need for timely follow up as directed.   They were provided with a work/school excuse if requested.    Orders and Diagnoses  Diagnoses and all orders for this visit:  Closed " nondisplaced fracture of left patella, unspecified fracture morphology, initial encounter  -     Referral to Orthopaedic Surgery; Future  Pain  -     XR knee left 4+ views; Future      Medical Admin Record      Patient disposition: Home    Electronically signed by FARIBA Echevarria  9:12 AM

## 2025-03-18 ENCOUNTER — OFFICE VISIT (OUTPATIENT)
Dept: ORTHOPEDIC SURGERY | Facility: CLINIC | Age: 85
End: 2025-03-18
Payer: MEDICARE

## 2025-03-18 VITALS — HEIGHT: 63 IN | WEIGHT: 104 LBS | BODY MASS INDEX: 18.43 KG/M2

## 2025-03-18 DIAGNOSIS — S82.002A CLOSED NONDISPLACED FRACTURE OF LEFT PATELLA, UNSPECIFIED FRACTURE MORPHOLOGY, INITIAL ENCOUNTER: ICD-10-CM

## 2025-03-18 PROCEDURE — 1160F RVW MEDS BY RX/DR IN RCRD: CPT | Performed by: FAMILY MEDICINE

## 2025-03-18 PROCEDURE — 1036F TOBACCO NON-USER: CPT | Performed by: FAMILY MEDICINE

## 2025-03-18 PROCEDURE — 1123F ACP DISCUSS/DSCN MKR DOCD: CPT | Performed by: FAMILY MEDICINE

## 2025-03-18 PROCEDURE — 99204 OFFICE O/P NEW MOD 45 MIN: CPT | Performed by: FAMILY MEDICINE

## 2025-03-18 PROCEDURE — 1159F MED LIST DOCD IN RCRD: CPT | Performed by: FAMILY MEDICINE

## 2025-03-18 PROCEDURE — 1157F ADVNC CARE PLAN IN RCRD: CPT | Performed by: FAMILY MEDICINE

## 2025-03-18 NOTE — PROGRESS NOTES
History of Present Illness   Chief Complaint   Patient presents with    Left Knee - Injury     Tripped and fell onto her knee at her daughters house 3/15/25  +pain and swelling       The patient is 85 y.o. female  here with a complaint of left knee injury.  Patient injured her knee 3 days ago after a mechanical fall at her daughter's house playing with her great grandchildren landing on her left knee with acute onset of pain.  She was seen in urgent care following injury, she had x-rays obtained that revealed a nondisplaced patella fracture with intra-articular extension without step-off, she was placed into a knee immobilizer and recommended outpatient orthopedic follow-up.  She does admit to some improvement in symptoms, she has been taking Tylenol as needed for pain.  She has been weightbearing as tolerated.  She denies any numbness or tingling, she admits to some swelling and bruising.  No significant knee problems in the past.  Patient is quite active for her age, lives alone, is independent in her ADLs and IADLs.    Past Medical History:   Diagnosis Date    Body mass index (BMI) 21.0-21.9, adult     BMI 21.0-21.9, adult    Body mass index (BMI) 21.0-21.9, adult 01/12/2022    Body mass index (BMI) of 21.0 to 21.9 in adult    Bowel habit changes 06/20/2023    Candida esophagitis (Multi) 06/20/2023    Contusion of scalp, sequela 09/13/2021    Contusion of scalp, sequela    Contusion of unspecified part of head, initial encounter 09/13/2021    Contusion of head    Early satiety 06/20/2023    Elevated blood-pressure reading, without diagnosis of hypertension 10/19/2019    Elevated blood pressure reading without diagnosis of hypertension    Encounter for immunization 04/09/2018    Need for Tdap vaccination    Epigastric pain 06/20/2023    Gout, unspecified 07/20/2018    Gout of left foot    Hydronephrosis, left 06/20/2023    Leukopenia 06/20/2023    Microscopic hematuria 06/20/2023    Other signs and symptoms in  breast 08/18/2021    Abnormal breast exam    Other specified disorders of bone density and structure, other site 06/16/2020    Osteopenia of lumbar spine    Pain in left foot 07/20/2018    Left foot pain    Personal history of other drug therapy 01/30/2019    History of pneumococcal vaccination    Personal history of other specified conditions 06/03/2019    History of dysuria    Personal history of other specified conditions 08/18/2021    History of dysuria    Personal history of other specified conditions 04/03/2022    History of dysuria    Personal history of other specified conditions     History of dysuria    Personal history of urinary (tract) infections 12/22/2017    History of urinary tract infection    Personal history of urinary (tract) infections 11/10/2018    History of urinary tract infection    Trapezius muscle spasm 06/20/2023    Unspecified lump in unspecified breast 08/18/2021    Breast nodule    Unspecified symptoms and signs involving the genitourinary system     UTI symptoms    Unspecified symptoms and signs involving the genitourinary system 10/19/2019    Symptoms involving urinary system    Unspecified symptoms and signs involving the genitourinary system     UTI symptoms    Urinary tract infection, site not specified 08/18/2021    Acute UTI    Urinary tract infection, site not specified 05/30/2019    E. coli UTI    White coat syndrome without hypertension 06/20/2023    Word finding difficulty 07/12/2023       Medication Documentation Review Audit       Reviewed by Jaida Wilson (Technologist) on 03/16/25 at 0852      Medication Order Taking? Sig Documenting Provider Last Dose Status   ascorbic acid (Vitamin C) 500 mg tablet [id number] Yes Take 1 tablet (500 mg) by mouth once daily. Historical Provider, MD Taking Active   chencho cit-mag-D3-Zn--man-bor (Citracal-D3 Plus Magnesium) 250- mg-mg-unit tablet [id number] Yes Take by mouth. Historical Provider, MD Taking Active   cholecalciferol (Vitamin  D-3) 50 mcg (2,000 unit) capsule [id number] Yes Take 1 capsule (50 mcg) by mouth early in the morning.. Historical Provider, MD Taking Active   cyanocobalamin (Vitamin B-12) 1,000 mcg tablet [id number] Yes Take 1 tablet (1,000 mcg) by mouth once daily. Historical Provider, MD Taking Active   estradiol (Estrace) 0.01 % (0.1 mg/gram) vaginal cream [id number] Yes Insert 0.5 Applicatorfuls (2 g) into the vagina once daily. Jadyn Nunez MD  Active   pravastatin (Pravachol) 40 mg tablet [id number] Yes Take 1 tablet (40 mg) by mouth once daily. Jadyn Nunez MD Taking Active                    Allergies   Allergen Reactions    Alendronate Unknown    Doxycycline Unknown    Ezetimibe-Simvastatin Myalgia       Social History     Socioeconomic History    Marital status:      Spouse name: Not on file    Number of children: Not on file    Years of education: Not on file    Highest education level: Not on file   Occupational History    Occupation: retired   Tobacco Use    Smoking status: Never     Passive exposure: Never    Smokeless tobacco: Never   Vaping Use    Vaping status: Never Used   Substance and Sexual Activity    Alcohol use: Never    Drug use: Never    Sexual activity: Not Currently     Partners: Male   Other Topics Concern    Not on file   Social History Narrative    Not on file     Social Drivers of Health     Financial Resource Strain: Low Risk  (7/9/2024)    Overall Financial Resource Strain (CARDIA)     Difficulty of Paying Living Expenses: Not hard at all   Food Insecurity: No Food Insecurity (7/9/2024)    Hunger Vital Sign     Worried About Running Out of Food in the Last Year: Never true     Ran Out of Food in the Last Year: Never true   Transportation Needs: No Transportation Needs (7/9/2024)    PRAPARE - Transportation     Lack of Transportation (Medical): No     Lack of Transportation (Non-Medical): No   Physical Activity: Sufficiently Active (7/9/2024)    Exercise Vital Sign     Days of Exercise  per Week: 7 days     Minutes of Exercise per Session: 60 min   Stress: No Stress Concern Present (7/9/2024)    [place] of [employer] - Occupational Stress Questionnaire     Feeling of Stress : Not at all   Social Connections: Socially Isolated (7/9/2024)    Social Connection and Isolation Panel [NHANES]     Frequency of Communication with Friends and Family: More than three times a week     Frequency of Social Gatherings with Friends and Family: Three times a week     Attends [employer]ist Services: Never     Active Member of Clubs or Organizations: No     Attends Club or Organization Meetings: Never     Marital Status:    Intimate Partner Violence: Not At Risk (7/9/2024)    Humiliation, Afraid, Rape, and Kick questionnaire     Fear of Current or Ex-Partner: No     Emotionally Abused: No     Physically Abused: No     Sexually Abused: No   Housing Stability: Low Risk  (7/9/2024)    Housing Stability Vital Sign     Unable to Pay for Housing in the Last Year: No     Number of Places Lived in the Last Year: 1     Unstable Housing in the Last Year: No       Past Surgical History:   Procedure Laterality Date    ABDOMINAL SURGERY  01/01/2001    to fix gi bleed, was between 2000 and 2004.    CATARACT EXTRACTION W/ INTRAOCULAR LENS IMPLANT Bilateral 01/01/2024    MOUTH SURGERY  04/01/2024    implants-on going          Review of Systems   GENERAL: Negative  GI: Negative  MUSCULOSKELETAL: See HPI  SKIN: Negative  NEURO:  Negative     Physical Exam:    General/Constitutional: well appearing, no distress, appears stated age  HEENT: sclera clear  Respiratory: non labored breathing  Vascular: No edema, swelling or tenderness, except as noted in detailed exam.  Integumentary: No impressive skin lesions present, except as noted in detailed exam.  Neurological:  Alert and oriented   Psychological:  Normal mood and affect.  Musculoskeletal: Normal, except as noted in detailed exam and in HPI.  Antalgic gait  unassisted    Left knee: There is some mild soft tissue swelling and ecchymosis over the anterior knee extending into the shin.  She has some tenderness palpation at fracture site at the inferior patella, there is no crepitus.  Extensor mechanism is intact.  Range of motion from 3 to 100 degrees with pain at endrange of flexion.  There is some mild weakness, 4 out of 5 with resisted knee extension.  No gross ligamentous laxity is present.       Imaging: X-rays of left knee from 3/16/2025 independently reviewed, there is a nondisplaced fracture and transverse orientation at the inferior aspect of the patella, there is intra-articular extension but no cortical step-off.      Assessment   1. Closed nondisplaced fracture of left patella, unspecified fracture morphology, initial encounter  Referral to Orthopaedic Surgery            Plan: Left knee nondisplaced patella fracture, intact extensor mechanism, fracture is stable for conservative/nonoperative management.  I will plan to have her continue with knee immobilizer for another 2 weeks, she can weight-bear as tolerated, she can use a walker to assist in ambulation for some balance.  She will use over-the-counter medications as needed for pain.  She will follow-up in 2 weeks with repeat x-rays of the left knee.

## 2025-03-30 DIAGNOSIS — Z79.899 ENCOUNTER FOR MONITORING STATIN THERAPY: Primary | ICD-10-CM

## 2025-03-30 DIAGNOSIS — Z51.81 ENCOUNTER FOR MONITORING STATIN THERAPY: Primary | ICD-10-CM

## 2025-04-04 ENCOUNTER — APPOINTMENT (OUTPATIENT)
Dept: ORTHOPEDIC SURGERY | Facility: CLINIC | Age: 85
End: 2025-04-04
Payer: MEDICARE

## 2025-04-04 ENCOUNTER — HOSPITAL ENCOUNTER (OUTPATIENT)
Dept: RADIOLOGY | Facility: CLINIC | Age: 85
Discharge: HOME | End: 2025-04-04
Payer: MEDICARE

## 2025-04-04 DIAGNOSIS — S82.002A CLOSED NONDISPLACED FRACTURE OF LEFT PATELLA, UNSPECIFIED FRACTURE MORPHOLOGY, INITIAL ENCOUNTER: ICD-10-CM

## 2025-04-04 PROCEDURE — 1159F MED LIST DOCD IN RCRD: CPT | Performed by: FAMILY MEDICINE

## 2025-04-04 PROCEDURE — 1036F TOBACCO NON-USER: CPT | Performed by: FAMILY MEDICINE

## 2025-04-04 PROCEDURE — 73562 X-RAY EXAM OF KNEE 3: CPT | Mod: LT

## 2025-04-04 PROCEDURE — 1123F ACP DISCUSS/DSCN MKR DOCD: CPT | Performed by: FAMILY MEDICINE

## 2025-04-04 PROCEDURE — 1160F RVW MEDS BY RX/DR IN RCRD: CPT | Performed by: FAMILY MEDICINE

## 2025-04-04 PROCEDURE — 1157F ADVNC CARE PLAN IN RCRD: CPT | Performed by: FAMILY MEDICINE

## 2025-04-04 PROCEDURE — 99213 OFFICE O/P EST LOW 20 MIN: CPT | Performed by: FAMILY MEDICINE

## 2025-04-04 NOTE — PROGRESS NOTES
History of Present Illness   Chief Complaint   Patient presents with    Left Knee - Follow-up     Tripped and fell onto her knee at her daughters house 3/15/25  +pain and swelling       The patient is 85 y.o. female  here for follow-up of left knee patella fracture.  She is approximately 3 weeks out from injury after a fall onto her knee, x-ray showed nondisplaced transverse fracture of the patella, at initial visit she had intact extensor mechanism, deemed stable for nonoperative management.  Treatment has been conservative with immobilization with knee in extension with knee immobilizer.  She has been compliant with this.  She mitts to good improvement in symptoms overall.  She denies any new injuries or symptoms.  Feels like there is some very mild stiffness.  Patient is quite active for her age, lives alone, is independent in her ADLs and IADLs.  Goal is to return back to walking, typically walks 2 miles per day.    Past Medical History:   Diagnosis Date    Body mass index (BMI) 21.0-21.9, adult     BMI 21.0-21.9, adult    Body mass index (BMI) 21.0-21.9, adult 01/12/2022    Body mass index (BMI) of 21.0 to 21.9 in adult    Bowel habit changes 06/20/2023    Candida esophagitis (Multi) 06/20/2023    Contusion of scalp, sequela 09/13/2021    Contusion of scalp, sequela    Contusion of unspecified part of head, initial encounter 09/13/2021    Contusion of head    Early satiety 06/20/2023    Elevated blood-pressure reading, without diagnosis of hypertension 10/19/2019    Elevated blood pressure reading without diagnosis of hypertension    Encounter for immunization 04/09/2018    Need for Tdap vaccination    Epigastric pain 06/20/2023    Gout, unspecified 07/20/2018    Gout of left foot    Hydronephrosis, left 06/20/2023    Leukopenia 06/20/2023    Microscopic hematuria 06/20/2023    Other signs and symptoms in breast 08/18/2021    Abnormal breast exam    Other specified disorders of bone density and structure,  other site 06/16/2020    Osteopenia of lumbar spine    Pain in left foot 07/20/2018    Left foot pain    Personal history of other drug therapy 01/30/2019    History of pneumococcal vaccination    Personal history of other specified conditions 06/03/2019    History of dysuria    Personal history of other specified conditions 08/18/2021    History of dysuria    Personal history of other specified conditions 04/03/2022    History of dysuria    Personal history of other specified conditions     History of dysuria    Personal history of urinary (tract) infections 12/22/2017    History of urinary tract infection    Personal history of urinary (tract) infections 11/10/2018    History of urinary tract infection    Trapezius muscle spasm 06/20/2023    Unspecified lump in unspecified breast 08/18/2021    Breast nodule    Unspecified symptoms and signs involving the genitourinary system     UTI symptoms    Unspecified symptoms and signs involving the genitourinary system 10/19/2019    Symptoms involving urinary system    Unspecified symptoms and signs involving the genitourinary system     UTI symptoms    Urinary tract infection, site not specified 08/18/2021    Acute UTI    Urinary tract infection, site not specified 05/30/2019    E. coli UTI    White coat syndrome without hypertension 06/20/2023    Word finding difficulty 07/12/2023       Medication Documentation Review Audit       Reviewed by José Jiang MD (Physician) on 03/18/25 at 1154      Medication Order Taking? Sig Documenting Provider Last Dose Status   ascorbic acid (Vitamin C) 500 mg tablet [id number] No Take 1 tablet (500 mg) by mouth once daily. Historical Provider, MD Taking Active   chencho cit-mag-D3-Zn--man-bor (Citracal-D3 Plus Magnesium) 250- mg-mg-unit tablet [id number] No Take by mouth. Historical Provider, MD Taking Active   cholecalciferol (Vitamin D-3) 50 mcg (2,000 unit) capsule [id number] No Take 1 capsule (50 mcg) by mouth early in the morning..  Historical Provider, MD Taking Active   cyanocobalamin (Vitamin B-12) 1,000 mcg tablet [id number] No Take 1 tablet (1,000 mcg) by mouth once daily. Historical Provider, MD Taking Active   estradiol (Estrace) 0.01 % (0.1 mg/gram) vaginal cream [id number]  Insert 0.5 Applicatorfuls (2 g) into the vagina once daily. Jadyn Nunez MD  Active   pravastatin (Pravachol) 40 mg tablet [id number] No Take 1 tablet (40 mg) by mouth once daily. Jadyn Nunez MD Taking Active                    Allergies   Allergen Reactions    Alendronate Unknown    Doxycycline Unknown    Ezetimibe-Simvastatin Myalgia       Social History     Socioeconomic History    Marital status:      Spouse name: Not on file    Number of children: Not on file    Years of education: Not on file    Highest education level: Not on file   Occupational History    Occupation: retired   Tobacco Use    Smoking status: Never     Passive exposure: Never    Smokeless tobacco: Never   Vaping Use    Vaping status: Never Used   Substance and Sexual Activity    Alcohol use: Never    Drug use: Never    Sexual activity: Not Currently     Partners: Male   Other Topics Concern    Not on file   Social History Narrative    Not on file     Social Drivers of Health     Financial Resource Strain: Low Risk  (7/9/2024)    Overall Financial Resource Strain (CARDIA)     Difficulty of Paying Living Expenses: Not hard at all   Food Insecurity: No Food Insecurity (7/9/2024)    Hunger Vital Sign     Worried About Running Out of Food in the Last Year: Never true     Ran Out of Food in the Last Year: Never true   Transportation Needs: No Transportation Needs (7/9/2024)    PRAPARE - Transportation     Lack of Transportation (Medical): No     Lack of Transportation (Non-Medical): No   Physical Activity: Sufficiently Active (7/9/2024)    Exercise Vital Sign     Days of Exercise per Week: 7 days     Minutes of Exercise per Session: 60 min   Stress: No Stress Concern Present (7/9/2024)     [facility] of Occupational Health - Occupational Stress Questionnaire     Feeling of Stress : Not at all   Social Connections: Socially Isolated (7/9/2024)    Social Connection and Isolation Panel [NHANES]     Frequency of Communication with Friends and Family: More than three times a week     Frequency of Social Gatherings with Friends and Family: Three times a week     Attends Confucianism Services: Never     Active Member of Clubs or Organizations: No     Attends Club or Organization Meetings: Never     Marital Status:    Intimate Partner Violence: Not At Risk (7/9/2024)    Humiliation, Afraid, Rape, and Kick questionnaire     Fear of Current or Ex-Partner: No     Emotionally Abused: No     Physically Abused: No     Sexually Abused: No   Housing Stability: Low Risk  (7/9/2024)    Housing Stability Vital Sign     Unable to Pay for Housing in the Last Year: No     Number of Places Lived in the Last Year: 1     Unstable Housing in the Last Year: No       Past Surgical History:   Procedure Laterality Date    ABDOMINAL SURGERY  01/01/2001    to fix gi bleed, was between 2000 and 2004.    CATARACT EXTRACTION W/ INTRAOCULAR LENS IMPLANT Bilateral 01/01/2024    MOUTH SURGERY  04/01/2024    implants-on going          Review of Systems   GENERAL: Negative  GI: Negative  MUSCULOSKELETAL: See HPI  SKIN: Negative  NEURO:  Negative     Physical Exam:    General/Constitutional: well appearing, no distress, appears stated age  HEENT: sclera clear  Respiratory: non labored breathing  Vascular: No edema, swelling or tenderness, except as noted in detailed exam.  Integumentary: No impressive skin lesions present, except as noted in detailed exam.  Neurological:  Alert and oriented   Psychological:  Normal mood and affect.  Musculoskeletal: Normal, except as noted in detailed exam and in HPI.  In wheelchair today, minimal antalgic gait weightbearing      Left knee: Normal appearance, soft tissue swelling and ecchymosis  have resolved.  There is some mild residual tenderness at the patella.  No other areas of tenderness to palpation.  She has good range of motion from 0 to 115 degrees.  Extensor mechanism is intact.   There is some mild weakness, 4+ out of 5 with resisted knee extension.  No gross ligamentous laxity is present.       Imaging: Repeat x-rays of left knee obtained today and independently reviewed, there is stable appearance of nondisplaced fracture and transverse orientation at the inferior aspect of the patella, there is intra-articular extension but no cortical step-off.      Assessment   1. Closed nondisplaced fracture of left patella, unspecified fracture morphology, initial encounter  XR knee left 3 views            Plan: Approximate 3 weeks out from injury, doing well, stable appearance on repeat x-rays, minimal tenderness to palpation, she has good range of motion, extensor mechanism remains intact with very minimal weakness on exam.  Recommended continued conservative management.  We will continue with the immobilization for another 2 weeks with walking/weightbearing.  She will follow-up in 2 weeks for reassessment with repeat x-rays of the left knee, 2 view AP and lateral knee x-ray will suffice.

## 2025-04-18 ENCOUNTER — HOSPITAL ENCOUNTER (OUTPATIENT)
Dept: RADIOLOGY | Facility: CLINIC | Age: 85
Discharge: HOME | End: 2025-04-18
Payer: MEDICARE

## 2025-04-18 ENCOUNTER — APPOINTMENT (OUTPATIENT)
Dept: ORTHOPEDIC SURGERY | Facility: CLINIC | Age: 85
End: 2025-04-18
Payer: COMMERCIAL

## 2025-04-18 DIAGNOSIS — S82.002A CLOSED NONDISPLACED FRACTURE OF LEFT PATELLA, UNSPECIFIED FRACTURE MORPHOLOGY, INITIAL ENCOUNTER: ICD-10-CM

## 2025-04-18 PROCEDURE — 1159F MED LIST DOCD IN RCRD: CPT | Performed by: FAMILY MEDICINE

## 2025-04-18 PROCEDURE — 1036F TOBACCO NON-USER: CPT | Performed by: FAMILY MEDICINE

## 2025-04-18 PROCEDURE — 1123F ACP DISCUSS/DSCN MKR DOCD: CPT | Performed by: FAMILY MEDICINE

## 2025-04-18 PROCEDURE — 1157F ADVNC CARE PLAN IN RCRD: CPT | Performed by: FAMILY MEDICINE

## 2025-04-18 PROCEDURE — 99213 OFFICE O/P EST LOW 20 MIN: CPT | Performed by: FAMILY MEDICINE

## 2025-04-18 PROCEDURE — 1160F RVW MEDS BY RX/DR IN RCRD: CPT | Performed by: FAMILY MEDICINE

## 2025-04-18 PROCEDURE — 73560 X-RAY EXAM OF KNEE 1 OR 2: CPT | Mod: LT

## 2025-04-18 NOTE — PROGRESS NOTES
History of Present Illness   Chief Complaint   Patient presents with    Left Knee - Follow-up       The patient is 85 y.o. female  here for follow-up of left knee patella fracture.  She is approximately 5 weeks out from injury after a fall onto her knee, x-ray showed nondisplaced transverse fracture of the patella, at initial visit she had intact extensor mechanism, deemed stable for nonoperative management.  Treatment has been conservative with immobilization with knee in extension with knee immobilizer.  At 3-week follow-up visit she was doing well with minimal tenderness, good range of motion at the knee, recommended another 2 weeks of knee immobilization with follow-up today for reassessment.  She admits to ongoing improvement in symptoms overall, very minimal residual pain.  She denies any new injuries or symptoms.  Patient is quite active for her age, lives alone, is independent in her ADLs and IADLs.  Goal is to return back to walking, typically walks 2 miles per day.    Past Medical History:   Diagnosis Date    Body mass index (BMI) 21.0-21.9, adult     BMI 21.0-21.9, adult    Body mass index (BMI) 21.0-21.9, adult 01/12/2022    Body mass index (BMI) of 21.0 to 21.9 in adult    Bowel habit changes 06/20/2023    Candida esophagitis (Multi) 06/20/2023    Contusion of scalp, sequela 09/13/2021    Contusion of scalp, sequela    Contusion of unspecified part of head, initial encounter 09/13/2021    Contusion of head    Early satiety 06/20/2023    Elevated blood-pressure reading, without diagnosis of hypertension 10/19/2019    Elevated blood pressure reading without diagnosis of hypertension    Encounter for immunization 04/09/2018    Need for Tdap vaccination    Epigastric pain 06/20/2023    Gout, unspecified 07/20/2018    Gout of left foot    Hydronephrosis, left 06/20/2023    Leukopenia 06/20/2023    Microscopic hematuria 06/20/2023    Other signs and symptoms in breast 08/18/2021    Abnormal breast exam     Other specified disorders of bone density and structure, other site 06/16/2020    Osteopenia of lumbar spine    Pain in left foot 07/20/2018    Left foot pain    Personal history of other drug therapy 01/30/2019    History of pneumococcal vaccination    Personal history of other specified conditions 06/03/2019    History of dysuria    Personal history of other specified conditions 08/18/2021    History of dysuria    Personal history of other specified conditions 04/03/2022    History of dysuria    Personal history of other specified conditions     History of dysuria    Personal history of urinary (tract) infections 12/22/2017    History of urinary tract infection    Personal history of urinary (tract) infections 11/10/2018    History of urinary tract infection    Trapezius muscle spasm 06/20/2023    Unspecified lump in unspecified breast 08/18/2021    Breast nodule    Unspecified symptoms and signs involving the genitourinary system     UTI symptoms    Unspecified symptoms and signs involving the genitourinary system 10/19/2019    Symptoms involving urinary system    Unspecified symptoms and signs involving the genitourinary system     UTI symptoms    Urinary tract infection, site not specified 08/18/2021    Acute UTI    Urinary tract infection, site not specified 05/30/2019    E. coli UTI    White coat syndrome without hypertension 06/20/2023    Word finding difficulty 07/12/2023       Medication Documentation Review Audit       Reviewed by José Jiang MD (Physician) on 04/04/25 at 1043      Medication Order Taking? Sig Documenting Provider Last Dose Status   ascorbic acid (Vitamin C) 500 mg tablet [id number] No Take 1 tablet (500 mg) by mouth once daily. Historical Provider, MD Taking Active   chencho cit-mag-D3-Zn--man-bor (Citracal-D3 Plus Magnesium) 250- mg-mg-unit tablet [id number] No Take by mouth. Historical Provider, MD Taking Active   cholecalciferol (Vitamin D-3) 50 mcg (2,000 unit) capsule [id number] No  Take 1 capsule (50 mcg) by mouth early in the morning.. Historical Provider, MD Taking Active   cyanocobalamin (Vitamin B-12) 1,000 mcg tablet [id number] No Take 1 tablet (1,000 mcg) by mouth once daily. Historical Provider, MD Taking Active   estradiol (Estrace) 0.01 % (0.1 mg/gram) vaginal cream [id number]  Insert 0.5 Applicatorfuls (2 g) into the vagina once daily. Jadyn Nunez MD  Active   pravastatin (Pravachol) 40 mg tablet [id number] No Take 1 tablet (40 mg) by mouth once daily. Jadyn Nunez MD Taking Active                    Allergies   Allergen Reactions    Alendronate Unknown    Doxycycline Unknown    Ezetimibe-Simvastatin Myalgia       Social History     Socioeconomic History    Marital status:      Spouse name: Not on file    Number of children: Not on file    Years of education: Not on file    Highest education level: Not on file   Occupational History    Occupation: retired   Tobacco Use    Smoking status: Never     Passive exposure: Never    Smokeless tobacco: Never   Vaping Use    Vaping status: Never Used   Substance and Sexual Activity    Alcohol use: Never    Drug use: Never    Sexual activity: Not Currently     Partners: Male   Other Topics Concern    Not on file   Social History Narrative    Not on file     Social Drivers of Health     Financial Resource Strain: Low Risk  (7/9/2024)    Overall Financial Resource Strain (CARDIA)     Difficulty of Paying Living Expenses: Not hard at all   Food Insecurity: No Food Insecurity (7/9/2024)    Hunger Vital Sign     Worried About Running Out of Food in the Last Year: Never true     Ran Out of Food in the Last Year: Never true   Transportation Needs: No Transportation Needs (7/9/2024)    PRAPARE - Transportation     Lack of Transportation (Medical): No     Lack of Transportation (Non-Medical): No   Physical Activity: Sufficiently Active (7/9/2024)    Exercise Vital Sign     Days of Exercise per Week: 7 days     Minutes of Exercise per Session:  60 min   Stress: No Stress Concern Present (7/9/2024)    [employer] [place] [employer] - Occupational Stress Questionnaire     Feeling of Stress : Not at all   Social Connections: Socially Isolated (7/9/2024)    Social Connection and Isolation Panel [NHANES]     Frequency of Communication with Friends and Family: More than three times a week     Frequency of Social Gatherings with Friends and Family: Three times a week     Attends Yazidi Services: Never     Active Member of Clubs or Organizations: No     Attends Club or Organization Meetings: Never     Marital Status:    Intimate Partner Violence: Not At Risk (7/9/2024)    Humiliation, Afraid, Rape, and Kick questionnaire     Fear of Current or Ex-Partner: No     Emotionally Abused: No     Physically Abused: No     Sexually Abused: No   Housing Stability: Low Risk  (7/9/2024)    Housing Stability Vital Sign     Unable to Pay for Housing in the Last Year: No     Number of Places Lived in the Last Year: 1     Unstable Housing in the Last Year: No       Past Surgical History:   Procedure Laterality Date    ABDOMINAL SURGERY  01/01/2001    to fix gi bleed, was between 2000 and 2004.    CATARACT EXTRACTION W/ INTRAOCULAR LENS IMPLANT Bilateral 01/01/2024    MOUTH SURGERY  04/01/2024    implants-on going          Review of Systems   GENERAL: Negative  GI: Negative  MUSCULOSKELETAL: See HPI  SKIN: Negative  NEURO:  Negative     Physical Exam:    General/Constitutional: well appearing, no distress, appears stated age  HEENT: sclera clear  Respiratory: non labored breathing  Vascular: No edema, swelling or tenderness, except as noted in detailed exam.  Integumentary: No impressive skin lesions present, except as noted in detailed exam.  Neurological:  Alert and oriented   Psychological:  Normal mood and affect.  Musculoskeletal: Normal, except as noted in detailed exam and in HPI.  In wheelchair today, minimal antalgic gait weightbearing      Left knee:  Normal appearance, soft tissue swelling and ecchymosis have resolved.  There is no residual tenderness at the patella.  No other areas of tenderness to palpation.  She has good range of motion from 0 to 120 degrees.  Extensor mechanism is intact.   There is no weakness but mild pain with resisted knee extension today.  No gross ligamentous laxity is present.       Imaging: Repeat x-rays of left knee obtained today and independently reviewed, there is been some interval healing of nondisplaced fracture and transverse orientation at the inferior aspect of the patella, there is intra-articular extension but no cortical step-off.  Fracture line is less visible compared to previous x-rays.      Assessment   1. Closed nondisplaced fracture of left patella, unspecified fracture morphology, initial encounter  XR knee left 1-2 views            Plan: Approximate 5 weeks out from injury, doing well, no residual tenderness to palpation at fracture site, she has full range of motion at the knee.  Recommended conservative management.  No indication for any physical therapy at this time.  She can discontinue knee immobilizer, gradual return to her walking activity over the next few weeks as tolerated by symptoms.  She will follow-up as symptoms dictate.

## 2025-06-14 ENCOUNTER — OFFICE VISIT (OUTPATIENT)
Dept: URGENT CARE | Age: 85
End: 2025-06-14
Payer: MEDICARE

## 2025-06-14 VITALS
WEIGHT: 124 LBS | HEIGHT: 63 IN | TEMPERATURE: 97.7 F | BODY MASS INDEX: 21.97 KG/M2 | DIASTOLIC BLOOD PRESSURE: 70 MMHG | RESPIRATION RATE: 18 BRPM | HEART RATE: 65 BPM | OXYGEN SATURATION: 98 % | SYSTOLIC BLOOD PRESSURE: 185 MMHG

## 2025-06-14 DIAGNOSIS — N30.90 CYSTITIS WITHOUT HEMATURIA: ICD-10-CM

## 2025-06-14 DIAGNOSIS — R30.9 PAINFUL URINATION: Primary | ICD-10-CM

## 2025-06-14 LAB
POC APPEARANCE, URINE: ABNORMAL
POC BILIRUBIN, URINE: NEGATIVE
POC BLOOD, URINE: NEGATIVE
POC COLOR, URINE: YELLOW
POC GLUCOSE, URINE: NEGATIVE MG/DL
POC KETONES, URINE: NEGATIVE MG/DL
POC LEUKOCYTES, URINE: ABNORMAL
POC NITRITE,URINE: NEGATIVE
POC PH, URINE: 6 PH
POC PROTEIN, URINE: NEGATIVE MG/DL
POC SPECIFIC GRAVITY, URINE: 1.01
POC UROBILINOGEN, URINE: 0.2 EU/DL

## 2025-06-14 RX ORDER — NITROFURANTOIN 25; 75 MG/1; MG/1
100 CAPSULE ORAL 2 TIMES DAILY
Qty: 10 CAPSULE | Refills: 0 | Status: SHIPPED | OUTPATIENT
Start: 2025-06-14 | End: 2025-06-19

## 2025-06-14 ASSESSMENT — PAIN SCALES - GENERAL: PAINLEVEL_OUTOF10: 2

## 2025-06-14 NOTE — PROGRESS NOTES
"Subjective   Patient ID: Ange Delaney is a 85 y.o. female. They present today with a chief complaint of Difficulty Urinating (Burning and pressure x 4 days ).    History of Present Illness  HPI  4 days of frequent uncomfortable urination. No blood noted in urine or stool. No abdominal pains. No back pain. No vaginal discharge or rash. No nausea, vomiting or diarrhea. No known exposures to STDs. Denies fevers or chills. No medications taken yet for symptoms.      Past Medical History  Allergies as of 06/14/2025 - Reviewed 06/14/2025   Allergen Reaction Noted    Alendronate Unknown 06/20/2023    Doxycycline Unknown 06/20/2023    Ezetimibe-simvastatin Myalgia 06/20/2023       Prescriptions Prior to Admission[1]     Medical History[2]    Surgical History[3]     reports that she has never smoked. She has never been exposed to tobacco smoke. She has never used smokeless tobacco. She reports that she does not drink alcohol and does not use drugs.    Review of Systems  Review of Systems          As in history of present illness                     Objective    Vitals:    06/14/25 0827   BP: (!) 185/70   BP Location: Right arm   Patient Position: Sitting   BP Cuff Size: Adult   Pulse: 65   Resp: 18   Temp: 36.5 °C (97.7 °F)   TempSrc: Oral   SpO2: 98%   Weight: 56.2 kg (124 lb)   Height: 1.6 m (5' 3\")     No LMP recorded. Patient is postmenopausal.    Physical Exam  Alert and oriented, no apparent distress  Abdomen soft, nontender, nondistended.  No CVA tenderness  Skin shows no rashes sores or lesions   exam not indicated at this time  Procedures    Point of Care Test & Imaging Results from this visit  Results for orders placed or performed in visit on 06/14/25   POCT UA Automated manually resulted   Result Value Ref Range    POC Color, Urine Yellow Straw, Yellow, Light-Yellow    POC Appearance, Urine Cloudy (A) Clear    POC Glucose, Urine NEGATIVE NEGATIVE mg/dl    POC Bilirubin, Urine NEGATIVE NEGATIVE    POC Ketones, " Urine NEGATIVE NEGATIVE mg/dl    POC Specific Gravity, Urine 1.015 1.005 - 1.035    POC Blood, Urine NEGATIVE NEGATIVE    POC PH, Urine 6.0 No Reference Range Established PH    POC Protein, Urine NEGATIVE NEGATIVE mg/dl    POC Urobilinogen, Urine 0.2 0.2, 1.0 EU/DL    Poc Nitrite, Urine NEGATIVE NEGATIVE    POC Leukocytes, Urine TRACE (A) NEGATIVE      Imaging  No results found.    Cardiology, Vascular, and Other Imaging  No other imaging results found for the past 2 days      Diagnostic study results (if any) were reviewed by Steven Mixon MD.    Assessment/Plan   Allergies, medications, history, and pertinent labs/EKGs/Imaging reviewed by Steven Mixon MD.     Medical Decision Making  At time of discharge patient was clinically well-appearing and HDS for outpatient management. The patient and/or family was educated regarding diagnosis, supportive care, OTC and Rx medications. The patient and/or family was given the opportunity to ask questions prior to discharge.  They verbalized understanding of my discussion of the plans for treatment, expected course, indications to return to  or seek further evaluation in ED, and the need for timely follow up as directed.   They were provided with a work/school excuse if requested.    Orders and Diagnoses  Diagnoses and all orders for this visit:  Painful urination  -     POCT UA Automated manually resulted  Cystitis without hematuria  -     Urine Culture      Medical Admin Record      Patient disposition: Home    Electronically signed by Steven Mixon MD  8:56 AM           [1] (Not in a hospital admission)   [2]   Past Medical History:  Diagnosis Date    Body mass index (BMI) 21.0-21.9, adult     BMI 21.0-21.9, adult    Body mass index (BMI) 21.0-21.9, adult 01/12/2022    Body mass index (BMI) of 21.0 to 21.9 in adult    Bowel habit changes 06/20/2023    Candida esophagitis (Multi) 06/20/2023    Contusion of scalp, sequela 09/13/2021    Contusion of scalp, sequela     Contusion of unspecified part of head, initial encounter 09/13/2021    Contusion of head    Early satiety 06/20/2023    Elevated blood-pressure reading, without diagnosis of hypertension 10/19/2019    Elevated blood pressure reading without diagnosis of hypertension    Encounter for immunization 04/09/2018    Need for Tdap vaccination    Epigastric pain 06/20/2023    Gout, unspecified 07/20/2018    Gout of left foot    Hydronephrosis, left 06/20/2023    Leukopenia 06/20/2023    Microscopic hematuria 06/20/2023    Other signs and symptoms in breast 08/18/2021    Abnormal breast exam    Other specified disorders of bone density and structure, other site 06/16/2020    Osteopenia of lumbar spine    Pain in left foot 07/20/2018    Left foot pain    Personal history of other drug therapy 01/30/2019    History of pneumococcal vaccination    Personal history of other specified conditions 06/03/2019    History of dysuria    Personal history of other specified conditions 08/18/2021    History of dysuria    Personal history of other specified conditions 04/03/2022    History of dysuria    Personal history of other specified conditions     History of dysuria    Personal history of urinary (tract) infections 12/22/2017    History of urinary tract infection    Personal history of urinary (tract) infections 11/10/2018    History of urinary tract infection    Trapezius muscle spasm 06/20/2023    Unspecified lump in unspecified breast 08/18/2021    Breast nodule    Unspecified symptoms and signs involving the genitourinary system     UTI symptoms    Unspecified symptoms and signs involving the genitourinary system 10/19/2019    Symptoms involving urinary system    Unspecified symptoms and signs involving the genitourinary system     UTI symptoms    Urinary tract infection, site not specified 08/18/2021    Acute UTI    Urinary tract infection, site not specified 05/30/2019    E. coli UTI    White coat syndrome without hypertension  06/20/2023    Word finding difficulty 07/12/2023   [3]   Past Surgical History:  Procedure Laterality Date    ABDOMINAL SURGERY  01/01/2001    to fix gi bleed, was between 2000 and 2004.    CATARACT EXTRACTION W/ INTRAOCULAR LENS IMPLANT Bilateral 01/01/2024    MOUTH SURGERY  04/01/2024    implants-on going

## 2025-06-15 ENCOUNTER — TELEPHONE (OUTPATIENT)
Dept: URGENT CARE | Age: 85
End: 2025-06-15

## 2025-06-17 LAB — BACTERIA UR CULT: ABNORMAL

## 2025-06-18 ENCOUNTER — TELEPHONE (OUTPATIENT)
Dept: PRIMARY CARE | Facility: CLINIC | Age: 85
End: 2025-06-18
Payer: COMMERCIAL

## 2025-06-18 NOTE — TELEPHONE ENCOUNTER
Jada Shi called for patient and states patient was seen at urgent care for a uti and said she is not over the symptoms and she is not sure if the put in for a culture and she want to know if she need to give it a little more time or do she need to have a culture done     Want to have a call back    Gardenia [phone]

## 2025-06-18 NOTE — TELEPHONE ENCOUNTER
I called daughter, SUYAPA for her to call me back in the AM. The C&S and UA are in her chart from 6/14. She is not infected. This call came in from her daughter originally at 1:52PM.

## 2025-06-19 NOTE — TELEPHONE ENCOUNTER
"I spoke to daughter. I reviewed the results and recommendations with her. I also reviewed Dr Nunez's rec about the UA. She reports that her mother does sometimes \"get anxiety\" about her \"medical stuff.\" She reports that the pt c/o \"fullness\" and \"not emptying\" after she voids. She also says that her mom c/o frequency. I asked about her habits and she reports; \"She is probably drinking a ton of water\" this was in response to my question about how much water is she drinking. I explained that sometimes this generation can become a bit obsessive with their bathroom habits and she agreed. She is also a nurse.     She reports that there are no other UTI s/s and that she knows of no vaginal s/s. She will assess this with her mom over the next wk before her apt coming up. She said that she will explain that she (her mom) will likely have a pelvic exam at that visit. She will call us with any further s/s UTI.  "

## 2025-07-07 NOTE — PROGRESS NOTES
Subjective   Reason for Visit: Ange Delaney is an 85 y.o. female here for a Medicare Wellness Visit, Annual Physical, pelvic exam, and follow up of conditions. She presents today with her daughter, Gardenia.  Past Medical, Surgical, and Family History reviewed and updated in chart.  Reviewed all medications by prescribing practitioner or clinical pharmacist (such as prescriptions, OTCs, herbal therapies and supplements) and documented in the medical record.  (LAST VISIT PLAN FROM: 01/13/2025:  Patient Instructions:  Recommend Easy crossword puzzles and also add some arithmetic skills every day : work problems 2 digit or 3 digit addition and subtraction-there are workbooks for this you can find on line.  Blood test today to be sure nothing odd with the recent weight loss and to recheck the borderline thyroid test.  Also go to the radiology window while you are at the lab and schedule a carotid artery ultrasound  Follow up for your wellness visit in the summer and you will need a fasting blood test the week before that visit. Those orders will be placed at a later date.  Flu shot given today.  Please get an RSV vaccine at the pharmacy-the RSV is a one time only vaccine, it is not annual.  END INFO FROM PRIOR VISIT)    HPI  Health maintenance items last completed:  Colonoscopy: 05/05/2023; A 2 mm polyp removed in ascending colon, a 4 mm polyp removed in descending colon, normal mucosa throughout entire colon.  Repeat colonoscopy is not recommended due to age for surveillance.  Mammogram: 11/06/2024; No mammographic evidence of malignancy.  BI-RADS Category:  1 Negative. Recommendation:  Annual Screening. Recommended Date:  1 Year. Due 11/06/2025. Pt is healthy and there is a family history of breast ca, recommend continued screening and she is agreeable.  Bone Density: 04/03/2024: Osteopenia; LEFT FEMUR -TOTAL T-Score -2.2,   LEFT FEMUR -NECK T-Score -2.4.   Repeat due in 04/03/2026.    Urine albumin if HTN or DM2:  N/A.  Pap: Last completed on 10/01/2019; negative. Negative HPV.  Pelvic: Last completed 10/01/2019.  Patient is using Estrace 0.01% three times per week (0.5 applicator full).  Breast exam in office: Today  Vaccines due or not completed: P-13 completed 01/30/2019.  T-dap completed 04/09/2018.  Zoster completed.  No RSV on record.  Last Health Maintenance exam: 07/09/2024.    Patient Care Team:  Jadyn Nunez MD as PCP - General  Jadyn Nunez MD [facility] PCP [facility]  Janak Pascual DO as Consulting Physician (Gastroenterology)  José Jiang MD as Consulting Physician (Sports Medicine)     I reviewed current and past Medical, Surgical and Family History, as well as allergies and updated the medical record.    I reviewed the questions and answers of the Medicare Wellness Visit form including screenings for depression, alcohol, and fall risk.    I reviewed medications from this prescribing practitioner, outside practitioners, clinical pharmacist and patient directed including OTCs, herbal therapies and supplements, as well as prescriptions. These are documented in the medical record.      I discussed code status with the patient, and they understand the difference between full code and DNR.  Patient is full code.  I discussed HCPOA and LW. Her first HCPOA is her daughter, Gardenia Paris.    I reviewed and updated the patient's Care Team in the chart.    Patient reports having regular eye and dental examinations.    Health Maintenance:  We discussed recommended vaccines including Prevnar 20, influenza and Covid booster Fall 2025.  Patient does not usually get the Covid vaccines.  Also recommended RSV.    Mammogram due in November 2025.    BP today was 169/70.  Rechcek by me was 134/64.  Cardiac: No CP , palpitations, increased HDZ.  Resp: No SOB, wheezing, cough.  Extremities: No leg or ankle swelling, no claudication.  Neuro: No dizziness, syncope, blurred vision. No new  headaches.'    Falls and Fear of Falling:  Since last visit, patient reports having a fall, tripping over one of her grandchildren while on the way to the washroom.  She fell directly on her left knee suffering a patella fracture.  Resolved.  Denies concerns today.  We discussed her last DEXA scan.  Advised to continue with her calcium and vitamin D supplementation.  Patient is active, caring for her grandchildren.  We discussed that she is due for a repeat DEXA scan in 04/2026 and will revisit possible osteopenia medication after those results are available.     Hyperlipidemia:  No myalgias, nausea, abdominal pain.  Meds: Taking regularly, no adverse effect.  Patient stopped taking pravastatin 40 mg a couple of months ago and has not noticed any improvement in her memory.  We discussed that memory deficits can be a side effect of taking a statin but is not a permanent effect.  We discussed her cholesterol and plaque shown in her carotid arteries on scan in 01/2025.  No family history of heart disease.  We discussed that taking a statin would be up to her, but her daughter states that she does worry about her cholesterol.  Her daughter thought and patient agreed that she may worry less if her cholesterol was treated.  Patient is in agreement of restarting the medication but she requests  at a lower dose.  Order placed for pravastatin 20 mg daily.  Labs: Last LDL-cholesterol was 141 mg/dL on 07/07/2025.  LDL goal: <70 mg/dL.    Lab Results   Component Value Date    CHOL 236 (H) 07/07/2025    CHOL 179 08/09/2024    CHOL 217 (H) 03/20/2024     Lab Results   Component Value Date    HDL 64 07/07/2025    HDL 67.9 08/09/2024    HDL 70.1 03/20/2024     Lab Results   Component Value Date    LDLCALC 141 (H) 07/07/2025    LDLCALC 92 08/09/2024    LDLCALC 122 (H) 03/20/2024     Lab Results   Component Value Date    TRIG 170 (H) 07/07/2025    TRIG 98 08/09/2024    TRIG 125 03/20/2024     No components found for:  "\"CHOLHDL\"    Hypothyroidism:  Last TSH was 3.18 on 2025, WNL.  Not taking thyroid medication.    Weight Loss:  Patient had 3 dental implants by Dr. Haley (2024), and had a long recovery where it was difficult to eat.  She also has a history of candida esophagitis in the past.  Weight today is 110.2 pounds.  She has lost about 4 pounds since her last visit in January.  Patient reports that she eats well and will cook her meals.  She states she is a healthy eater.  Recommended drinking Ensure or Boost daily to help with maintaining / increasing her weight.    Word Finding Difficulty:  Of note, she had a CT Head on 2023 which showed mild-to-moderate brain parenchymal volume loss and mild nonspecific white matter changes noted within cerebral hemispheres bilaterally which while nonspecific, given the patient's age, likely represent sequelae of small-vessel ischemic change.  See anxiety below. An example of word finding difficulty is talking to her dtr about the \"boxes\" that were brought when she was trying to say pizza's -which were in a box.    She reports that if she is in an unfamiliar place, she will have difficulty finding words.  Gardenia states that it is becoming more frequent but thinks it is related to anxiety..  Patient states that she puts pressure on her self to be \"good\".  Patient reports that she does not let it bother her, as it will make it worse.  She will relax and eventually the word will come to her.  Gardenia disagrees and states that her mother is concerned about it. Gardenia noted that anxiety seemed to really occur after Ange's   years ago.    Anxiety:  MESHA-7 score of 3 today.  We discussed that treating her anxiety could be beneficial to her word finding difficulty.  We discussed Zoloft medication but patient does not wish to take another medication at this time.  Recommended trying Calm Aid daily.  Of note, her daughter thinks that her mother's anxiety has gotten worse since " her   last year.  We also discussed neuropsychology testing to further evaluate this and the word finding difficulty. .  Order placed for referral to Neuropsychology.  The  will help her schedule.      Vascular US of Carotid Arteries on 2025 showed:  CONCLUSIONS:  Right Carotid: Findings are consistent with less than 50% stenosis of the right proximal internal carotid artery. Right external carotid artery appears patent with no evidence of stenosis. The right vertebral artery demonstrates a high resistance waveform which may be suggestive of a more distal stenosis or occlusion. No evidence of hemodynamically significant stenosis in the right subclavian artery.  Left Carotid: Findings are consistent with less than 50% stenosis of the left proximal internal carotid artery. Left external carotid artery appears patent with no evidence of stenosis. The left vertebral artery demonstrates a high resistance waveform which may be suggestive of a more distal stenosis or occlusion. No evidence of hemodynamically significant stenosis in the left subclavian artery.  Imaging & Doppler Findings:  Right Plaque Morph: The proximal right internal carotid artery demonstrates smooth and heterogenous plaque.  Left Plaque Morph: The proximal left internal carotid artery demonstrates calcified, irregular and heterogenous plaque. The left carotid bulb demonstrates calcified, irregular and heterogenous plaque.    She underwent MMSE in 2023 which was 28/30; again in 2023 which was 30/30.     Renal Cyst:  Last CT urography was on 2023 which showed:  Impression   Scattered benign renal cysts. Dominant Bosniak II cyst in the left  kidney has slightly decreased in size compared to .  No suspicious upper tract lesion or hydronephrosis.  No urinary tract calculus.     Order placed for CT Urography to recheck renal cyst.  BUN, creatinine and EGFR were WNL at last check 2025.  I reviewed dr willingham's  2023 note and he requested a one year follow up -pt declines to follow up with urology. If that is the case, would then recommend a repeat ct urography to check on the bosniak 2 cyst. They would both prefer the CT instead of seeing urology, order placed. She tolerated contrast without issue before . Creatinine recent was normal.    Urinary frequency, uti symptoms--much improved on the intravaginal estrogen started by dr willingham for urogenital atrophy.. Will continue and she prefers to continue.declined chaperone for pelvic exam today. Dtr excused herself for this exam.  Orders placed for prescriptions as required.    Orders placed for blood work as required.    Follow up 11/19/2025.    Review of Systems  All systems reviewed and are negative unless otherwise stated in HPI or noted in writing on the written   3  page history and review of systems document reviewed by me and  scanned in with this visit. This is scanned either to notes or to media, with today's date.    Objective   The following test results have been reviewed:  Latest Complete Lab Results:  CBC w/Diff:   Lab Results   Component Value Date    WBC 5.2 07/07/2025    NRBC 0.0 01/13/2025    RBC 4.42 07/07/2025    HGB 14.2 07/07/2025    HCT 43.9 07/07/2025    MCV 99.3 07/07/2025    MCHC 32.3 07/07/2025     07/07/2025    RDW 13.1 07/07/2025    NEUTOPHILPCT 48.7 01/13/2025    IGPCT 0.2 01/13/2025    LYMPHOPCT 27.3 07/07/2025    MONOPCT 9.7 07/07/2025    EOSPCT 1.0 07/07/2025    BASOPCT 0.8 07/07/2025    NEUTROABS 2.60 01/13/2025    LYMPHSABS 1.89 01/13/2025    MONOSABS 0.69 01/13/2025    EOSABS 52 07/07/2025    BASOSABS 42 07/07/2025      CMP:  Lab Results   Component Value Date    GLUCOSE 99 07/07/2025     07/07/2025    K 4.6 07/07/2025    CL 98 07/07/2025    CO2 28 07/07/2025    ANIONGAP 11 07/07/2025    BUN 14 07/07/2025    CREATININE 0.80 07/07/2025    GFRF 71 09/11/2023    CALCIUM 10.1 07/07/2025    ALBUMIN 4.7 07/07/2025    ALKPHOS 46  "07/07/2025    PROT 8.0 07/07/2025    AST 25 07/07/2025    BILITOT 0.6 07/07/2025    ALT 15 07/07/2025      Lipid:   Lab Results   Component Value Date    CHOL 236 (H) 07/07/2025    HDL 64 07/07/2025    CHHDL 3.7 07/07/2025    LDLF 144 (H) 09/11/2023    VLDL 20 08/09/2024    TRIG 170 (H) 07/07/2025     LDL Direct:  Lab Results   Component Value Date    LDLDIRECT 99 08/09/2024      TSH:   Lab Results   Component Value Date    TSH 3.18 07/07/2025      B12:  Lab Results   Component Value Date    NOBFUTHU97 1,232 (H) 03/20/2024     Vitamin D:  Lab Results   Component Value Date    VITD25 43 07/07/2025      HgA1c:   No results found for: \"HGBA1C\", \"SQQXOAHR9B\"  === 09/14/23 ===  CT UROGRAPHY WITH 3D VOLUME RENDERED IMAGING  - Impression -  Scattered benign renal cysts. Dominant Bosniak II cyst in the left  kidney has slightly decreased in size compared to 2020.  No suspicious upper tract lesion or hydronephrosis.  No urinary tract calculus.     Vitals:      11/6/2024     8:42 AM 1/13/2025     9:53 AM 3/16/2025     8:49 AM 3/18/2025    11:02 AM 6/14/2025     8:27 AM 7/21/2025     9:27 AM 7/21/2025    11:19 AM   Vitals   Systolic  134 152  185 169 134   Diastolic  72 75  70 70 64   BP Location   Right arm  Right arm     Heart Rate  64 94  65 72    Temp   36.4 °C (97.5 °F)  36.5 °C (97.7 °F)     Resp  18 18  18 16    Height 1.6 m (5' 3\") 1.6 m (5' 3\") 1.6 m (5' 3\") 1.6 m (5' 3\") 1.6 m (5' 3\") 1.6 m (5' 3\")    Weight (lb) 124 114 114 104 124 110.2    BMI 21.97 kg/m2 20.19 kg/m2 20.19 kg/m2 18.42 kg/m2 21.97 kg/m2 19.52 kg/m2    BSA (m2) 1.58 m2 1.52 m2 1.52 m2 1.45 m2 1.58 m2 1.49 m2        Physical Exam   Patient looks their usual self, is known to me, and is in no obvious distress.  HEENT: Patient does wear hearing aids which were removed for exam.  Tympanosclerosis noted, bilaterally.  PERRL.  EOMI.  Normocephalic, no facial asymmetry  Eyes: Sclera and conjunctiva are clear.  Neck: No adenopathy cervical (Ant/post/lat), no " Supraclavicular nodes.   Thyroid normal.  Carotid pulses normal, no carotid bruits.  Lungs : RR normal. Clear to auscultation anterior, posterior and lateral. No rales, wheezes rhonchi or rubs. Good air exchange.  Heart: RRR. No Murmur, gallop, click or rub.  Abdomen: Bowel sounds normal, No bruits. No pulsatile mass. No hepatosplenomegaly, masses or tenderness. Soft, no guarding.  Vascular:  Posterior tibialis and dorsalis pedis pulses within normal limits bilaterally.   Extremities: No upper extremity edema. No lower extremity edema.   Musculoskeletal: No synovitis of joints seen. No new deformity.  Neuro: CN 2-12 intact. Alert, appropriate.  Normal .  Negative pronator drift.  Ambulates independently.  No gross motor deficit.   No tremors.  Psych: normal mood and affect.  Skin: No rash, bruising petechiae or jaundice.    Breast Exam : Symmetric appearance. No masses, nipple discharge, skin retraction, axillary or supraclavicular adenopathy.    Gyn: Normal pelvic exam: External Genitalia without lesions.  Chronic redness and dryness noted of labia majora. Urethra normal. Bimanual exam: no uterine masses. No ovarian masses. No anal/perineal lesions. Recto vaginal exam normal. (Patient declined chaperone).  Recommended using additional small amount of topical Estrace on the outside of labia, as well as the applicator.  No Pap required.    Ange was seen today for medicare annual wellness visit subsequent.  Diagnoses and all orders for this visit:  Encounter for subsequent annual wellness visit (AWV) in Medicare patient (Primary)  -     1 Year Follow Up In [facility] - PCP - Wellness Exam; Future  Elevated LDL cholesterol level  -     pravastatin (Pravachol) 20 mg tablet; Take 1 tablet (20 mg) by mouth once daily.  -     Lipid Panel; Future  -     Aspartate Aminotransferase; Future  -     Alanine Aminotransferase; Future  -     Creatine Kinase; Future  -     Cholesterol, LDL Direct; Future  -      Follow Up In [facility] - PCP; Future  Carotid atherosclerosis, unspecified laterality  -     pravastatin (Pravachol) 20 mg tablet; Take 1 tablet (20 mg) by mouth once daily.  -     Lipid Panel; Future  -     Aspartate Aminotransferase; Future  -     Alanine Aminotransferase; Future  -     Creatine Kinase; Future  -     Cholesterol, LDL Direct; Future  -     Follow Up In [facility] - PCP; Future  Hypothyroidism, unspecified type  -     Lipid Panel; Future  -     Aspartate Aminotransferase; Future  -     Alanine Aminotransferase; Future  -     Creatine Kinase; Future  -     Cholesterol, LDL Direct; Future  -     Follow Up In [facility] - PCP; Future  Immunization counseling  -     Lipid Panel; Future  -     Aspartate Aminotransferase; Future  -     Alanine Aminotransferase; Future  -     Creatine Kinase; Future  -     Cholesterol, LDL Direct; Future  -     Follow Up In [facility] - PCP; Future  Vaginal atrophy  Comments:  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  -     Lipid Panel; Future  -     Aspartate Aminotransferase; Future  -     Alanine Aminotransferase; Future  -     Creatine Kinase; Future  -     Cholesterol, LDL Direct; Future  -     Follow Up In [facility] - PCP; Future  -     POCT UA Automated manually resulted  Renal cyst  -     CT urography w 3D volume rendered imaging; Future  -     Lipid Panel; Future  -     Aspartate Aminotransferase; Future  -     Alanine Aminotransferase; Future  -     Creatine Kinase; Future  -     Cholesterol, LDL Direct; Future  -     Follow Up In [facility] - PCP; Future  Word finding difficulty  -     Referral to Adult Neuropsychology; Future  -     Lipid Panel; Future  -     Aspartate Aminotransferase; Future  -     Alanine Aminotransferase; Future  -      Creatine Kinase; Future  -     Cholesterol, LDL Direct; Future  -     Follow Up In [facility] - PCP; Future  Encounter for monitoring statin therapy  -     Lipid Panel; Future  -     Aspartate Aminotransferase; Future  -     Alanine Aminotransferase; Future  -     Creatine Kinase; Future  -     Cholesterol, LDL Direct; Future  -     Follow Up In [facility] - PCP; Future  Recurrent UTI  Comments:  improved on estradiol cream, continue same  Orders:  -     POCT UA Automated manually resulted  Visit for screening mammogram  -     BI mammo bilateral screening tomosynthesis; Future  Family history of breast cancer in sister  -     BI mammo bilateral screening tomosynthesis; Future  White coat syndrome without diagnosis of hypertension    Medicare wellness visit  completed including:  Immunizations, reviewed in detail including current resp virus status and slight uptick of covid. Pt is exposed to young grandchildren regularly and will be in school soon, see recommendations  Health Maintenance items,  Discussion of advanced directives and code status, which is: full code, ok cpr  Fall risk and prevention addressed.  Functionality and pain were assessed.   Cancer screening testing was addressed as appropriate-see patient discussion/orders.   Medications were discussed and reviewed/reconciled and refill need assessed/handled.   Patient states taking their medication regularly and appropriately.  Also:   Depression screening PhQ-2 completed: neg but some anxiety  Alcohol misuse screen: neg    In addition to the wellness visit and screening, the above medical issues were addressed:  As well as Results of testing completed since last visit.    Pelvic and breast exam completed to assess urogenital atrophy, estrogen use, and fam hx breast ca.  Patient Instructions:  Vaccines recommended:    Prevnar 20, can get any time, this is not seasonal. This is a bacterial pneumonia vaccine.    Flu shot  October.  Consider covid vaccine. By September or October unless uptick now.  Consider RSV vaccine (one time only). August or September    Referrals: Neuropsychology testing. Reception to help you schedule.    Schedule CT urography to check on kidney cyst, do this in the next 3 weeks. This should be able to be done at our building.    Consider adding ensure or boost one per day.    Restart pravastatin but at the lower dose we discussed, 20 mg daily.  Follow up in 4 months.  Fasting blood test the week before that visit to see how pravastatin 20 mg is working.    Blood pressure was better on recheck but would like your daughter to confirm that your cuff is accurate at home.  We discussed trying calm aid one per day or one per day as needed for anxiety.            Ways to Help Prevent Falls at Home    Quick Tips   · Ask for help if you need it. Most people want to help!   · Get up slowly after sitting or laying down   · Wear a medical alert device or keep cell phone in your pocket   · Use night lights, especially areas near a bathroom   · Keep the items you use often within reach on a small stool or end table   · Use an assistive device such as walker or cane, as directed by provider/physical therapy   · Use a non-slip mat and grab bars in your bathroom. Look for home health sections for best options     Other Areas to Focus On   · Exercise and nutrition: Regular exercise or taking a falls prevention class are great ways improve strength and balance. Don't forget to stay hydrated and bring a snack!   · Medicine side effects: Some medicines can make you sleepy or dizzy, which could cause a fall. Ask your healthcare provider about the side effects your medicines could cause. Be sure to let them know if you take any vitamins or supplements as well.   · Tripping hazards: Remove items you could trip on, such as loose mats, rugs, cords, and clutter. Wear closed toe shoes with rubber soles.   · Health and wellness: Get  regular checkups with your healthcare provider, plus routine vision and hearing screenings. Talk with your healthcare provider about:   o Your medicines and the possible side effects - bring them in a bag if that is easier!   o Problems with balance or feeling dizzy   o Ways to promote bone health, such as Vitamin D and calcium supplements   o Questions or concerns about falling     *Ask your healthcare team if you have questions     [facility], 2022       --------  87 minutes     20 min documentation, the remained in the exam room.  Scribe Attestation  By signing my name below, INinfa, Scribe   attest that this documentation has been prepared under the direction and in the presence of Jadyn Nunez MD.

## 2025-07-08 LAB
25(OH)D3+25(OH)D2 SERPL-MCNC: 43 NG/ML (ref 30–100)
ALBUMIN SERPL-MCNC: 4.7 G/DL (ref 3.6–5.1)
ALP SERPL-CCNC: 46 U/L (ref 37–153)
ALT SERPL-CCNC: 15 U/L (ref 6–29)
ANION GAP SERPL CALCULATED.4IONS-SCNC: 11 MMOL/L (CALC) (ref 7–17)
AST SERPL-CCNC: 25 U/L (ref 10–35)
BASOPHILS # BLD AUTO: 42 CELLS/UL (ref 0–200)
BASOPHILS NFR BLD AUTO: 0.8 %
BILIRUB SERPL-MCNC: 0.6 MG/DL (ref 0.2–1.2)
BUN SERPL-MCNC: 14 MG/DL (ref 7–25)
CALCIUM SERPL-MCNC: 10.1 MG/DL (ref 8.6–10.4)
CHLORIDE SERPL-SCNC: 98 MMOL/L (ref 98–110)
CHOLEST SERPL-MCNC: 236 MG/DL
CHOLEST/HDLC SERPL: 3.7 (CALC)
CK SERPL-CCNC: 60 U/L (ref 16–215)
CO2 SERPL-SCNC: 28 MMOL/L (ref 20–32)
CREAT SERPL-MCNC: 0.8 MG/DL (ref 0.6–0.95)
EGFRCR SERPLBLD CKD-EPI 2021: 73 ML/MIN/1.73M2
EOSINOPHIL # BLD AUTO: 52 CELLS/UL (ref 15–500)
EOSINOPHIL NFR BLD AUTO: 1 %
ERYTHROCYTE [DISTWIDTH] IN BLOOD BY AUTOMATED COUNT: 13.1 % (ref 11–15)
GLUCOSE SERPL-MCNC: 99 MG/DL (ref 65–99)
HCT VFR BLD AUTO: 43.9 % (ref 35–45)
HDLC SERPL-MCNC: 64 MG/DL
HGB BLD-MCNC: 14.2 G/DL (ref 11.7–15.5)
LDLC SERPL CALC-MCNC: 141 MG/DL (CALC)
LYMPHOCYTES # BLD AUTO: 1420 CELLS/UL (ref 850–3900)
LYMPHOCYTES NFR BLD AUTO: 27.3 %
MCH RBC QN AUTO: 32.1 PG (ref 27–33)
MCHC RBC AUTO-ENTMCNC: 32.3 G/DL (ref 32–36)
MCV RBC AUTO: 99.3 FL (ref 80–100)
MONOCYTES # BLD AUTO: 504 CELLS/UL (ref 200–950)
MONOCYTES NFR BLD AUTO: 9.7 %
NEUTROPHILS # BLD AUTO: 3182 CELLS/UL (ref 1500–7800)
NEUTROPHILS NFR BLD AUTO: 61.2 %
NONHDLC SERPL-MCNC: 172 MG/DL (CALC)
PLATELET # BLD AUTO: 303 THOUSAND/UL (ref 140–400)
PMV BLD REES-ECKER: 9.3 FL (ref 7.5–12.5)
POTASSIUM SERPL-SCNC: 4.6 MMOL/L (ref 3.5–5.3)
PROT SERPL-MCNC: 8 G/DL (ref 6.1–8.1)
RBC # BLD AUTO: 4.42 MILLION/UL (ref 3.8–5.1)
SODIUM SERPL-SCNC: 137 MMOL/L (ref 135–146)
TRIGL SERPL-MCNC: 170 MG/DL
TSH SERPL-ACNC: 3.18 MIU/L (ref 0.4–4.5)
WBC # BLD AUTO: 5.2 THOUSAND/UL (ref 3.8–10.8)

## 2025-07-14 ENCOUNTER — APPOINTMENT (OUTPATIENT)
Dept: PRIMARY CARE | Facility: CLINIC | Age: 85
End: 2025-07-14
Payer: COMMERCIAL

## 2025-07-21 ENCOUNTER — APPOINTMENT (OUTPATIENT)
Dept: PRIMARY CARE | Facility: CLINIC | Age: 85
End: 2025-07-21
Payer: COMMERCIAL

## 2025-07-21 VITALS
SYSTOLIC BLOOD PRESSURE: 134 MMHG | HEART RATE: 72 BPM | BODY MASS INDEX: 19.53 KG/M2 | OXYGEN SATURATION: 94 % | HEIGHT: 63 IN | WEIGHT: 110.2 LBS | RESPIRATION RATE: 16 BRPM | DIASTOLIC BLOOD PRESSURE: 64 MMHG

## 2025-07-21 DIAGNOSIS — Z12.31 VISIT FOR SCREENING MAMMOGRAM: ICD-10-CM

## 2025-07-21 DIAGNOSIS — E78.00 ELEVATED LDL CHOLESTEROL LEVEL: ICD-10-CM

## 2025-07-21 DIAGNOSIS — Z80.3 FAMILY HISTORY OF BREAST CANCER IN SISTER: ICD-10-CM

## 2025-07-21 DIAGNOSIS — F41.9 ANXIETY: ICD-10-CM

## 2025-07-21 DIAGNOSIS — Z71.85 IMMUNIZATION COUNSELING: ICD-10-CM

## 2025-07-21 DIAGNOSIS — Z87.81 HISTORY OF PATELLAR FRACTURE: ICD-10-CM

## 2025-07-21 DIAGNOSIS — Z51.81 ENCOUNTER FOR MONITORING STATIN THERAPY: ICD-10-CM

## 2025-07-21 DIAGNOSIS — R03.0 WHITE COAT SYNDROME WITHOUT DIAGNOSIS OF HYPERTENSION: ICD-10-CM

## 2025-07-21 DIAGNOSIS — E03.9 HYPOTHYROIDISM, UNSPECIFIED TYPE: ICD-10-CM

## 2025-07-21 DIAGNOSIS — R47.89 WORD FINDING DIFFICULTY: ICD-10-CM

## 2025-07-21 DIAGNOSIS — Z00.00 ENCOUNTER FOR SUBSEQUENT ANNUAL WELLNESS VISIT (AWV) IN MEDICARE PATIENT: Primary | ICD-10-CM

## 2025-07-21 DIAGNOSIS — I70.0 ATHEROSCLEROSIS OF ABDOMINAL AORTA: ICD-10-CM

## 2025-07-21 DIAGNOSIS — Z79.899 ENCOUNTER FOR MONITORING STATIN THERAPY: ICD-10-CM

## 2025-07-21 DIAGNOSIS — N95.2 VAGINAL ATROPHY: ICD-10-CM

## 2025-07-21 DIAGNOSIS — N28.1 RENAL CYST: ICD-10-CM

## 2025-07-21 DIAGNOSIS — N39.0 RECURRENT UTI: ICD-10-CM

## 2025-07-21 DIAGNOSIS — Z78.9 FULL CODE STATUS: ICD-10-CM

## 2025-07-21 DIAGNOSIS — M85.852 OSTEOPENIA OF NECK OF LEFT FEMUR: ICD-10-CM

## 2025-07-21 DIAGNOSIS — Z01.419 ENCOUNTER FOR ROUTINE GYNECOLOGIC EXAMINATION IN MEDICARE PATIENT: ICD-10-CM

## 2025-07-21 DIAGNOSIS — I65.29 CAROTID ATHEROSCLEROSIS, UNSPECIFIED LATERALITY: ICD-10-CM

## 2025-07-21 LAB
POC APPEARANCE, URINE: CLEAR
POC BILIRUBIN, URINE: NEGATIVE
POC BLOOD, URINE: ABNORMAL
POC COLOR, URINE: YELLOW
POC GLUCOSE, URINE: NEGATIVE MG/DL
POC KETONES, URINE: NEGATIVE MG/DL
POC LEUKOCYTES, URINE: NEGATIVE
POC NITRITE,URINE: NEGATIVE
POC PH, URINE: 6 PH
POC PROTEIN, URINE: NEGATIVE MG/DL
POC SPECIFIC GRAVITY, URINE: 1.02
POC UROBILINOGEN, URINE: 0.2 EU/DL

## 2025-07-21 PROCEDURE — 1160F RVW MEDS BY RX/DR IN RCRD: CPT | Performed by: INTERNAL MEDICINE

## 2025-07-21 PROCEDURE — G0101 CA SCREEN;PELVIC/BREAST EXAM: HCPCS | Performed by: INTERNAL MEDICINE

## 2025-07-21 PROCEDURE — 3075F SYST BP GE 130 - 139MM HG: CPT | Performed by: INTERNAL MEDICINE

## 2025-07-21 PROCEDURE — 1170F FXNL STATUS ASSESSED: CPT | Performed by: INTERNAL MEDICINE

## 2025-07-21 PROCEDURE — 99214 OFFICE O/P EST MOD 30 MIN: CPT | Performed by: INTERNAL MEDICINE

## 2025-07-21 PROCEDURE — 1126F AMNT PAIN NOTED NONE PRSNT: CPT | Performed by: INTERNAL MEDICINE

## 2025-07-21 PROCEDURE — G0439 PPPS, SUBSEQ VISIT: HCPCS | Performed by: INTERNAL MEDICINE

## 2025-07-21 PROCEDURE — 1036F TOBACCO NON-USER: CPT | Performed by: INTERNAL MEDICINE

## 2025-07-21 PROCEDURE — 3078F DIAST BP <80 MM HG: CPT | Performed by: INTERNAL MEDICINE

## 2025-07-21 PROCEDURE — 1123F ACP DISCUSS/DSCN MKR DOCD: CPT | Performed by: INTERNAL MEDICINE

## 2025-07-21 PROCEDURE — 81003 URINALYSIS AUTO W/O SCOPE: CPT | Performed by: INTERNAL MEDICINE

## 2025-07-21 PROCEDURE — 1159F MED LIST DOCD IN RCRD: CPT | Performed by: INTERNAL MEDICINE

## 2025-07-21 RX ORDER — PRAVASTATIN SODIUM 20 MG/1
20 TABLET ORAL DAILY
Qty: 90 TABLET | Refills: 3 | Status: SHIPPED | OUTPATIENT
Start: 2025-07-21 | End: 2026-07-21

## 2025-07-21 RX ORDER — ESTRADIOL 0.1 MG/G
2 CREAM VAGINAL 3 TIMES WEEKLY
Qty: 42.5 G | Refills: 5 | Status: SHIPPED | OUTPATIENT
Start: 2025-07-21

## 2025-07-21 SDOH — ECONOMIC STABILITY: GENERAL
WHICH OF THE FOLLOWING DO YOU KNOW HOW TO USE AND HAVE ACCESS TO EVERY DAY? (CHOOSE ALL THAT APPLY): DESKTOP COMPUTER, LAPTOP COMPUTER, OR TABLET WITH BROADBAND INTERNET CONNECTION;SMARTPHONE WITH CELLULAR DATA PLAN

## 2025-07-21 SDOH — ECONOMIC STABILITY: GENERAL
WHICH OF THE FOLLOWING WOULD YOU LIKE TO GET CONNECTED TO IN ORDER TO RECEIVE A DISCOUNT OR FOR FREE? (CHOOSE ALL THAT APPLY): NO ASSISTANCE NEEDED

## 2025-07-21 SDOH — HEALTH STABILITY: PHYSICAL HEALTH: ON AVERAGE, HOW MANY DAYS PER WEEK DO YOU ENGAGE IN MODERATE TO STRENUOUS EXERCISE (LIKE A BRISK WALK)?: 7 DAYS

## 2025-07-21 SDOH — ECONOMIC STABILITY: INCOME INSECURITY: IN THE LAST 12 MONTHS, WAS THERE A TIME WHEN YOU WERE NOT ABLE TO PAY THE MORTGAGE OR RENT ON TIME?: NO

## 2025-07-21 SDOH — ECONOMIC STABILITY: FOOD INSECURITY: WITHIN THE PAST 12 MONTHS, YOU WORRIED THAT YOUR FOOD WOULD RUN OUT BEFORE YOU GOT MONEY TO BUY MORE.: NEVER TRUE

## 2025-07-21 SDOH — ECONOMIC STABILITY: FOOD INSECURITY: WITHIN THE PAST 12 MONTHS, THE FOOD YOU BOUGHT JUST DIDN'T LAST AND YOU DIDN'T HAVE MONEY TO GET MORE.: NEVER TRUE

## 2025-07-21 SDOH — HEALTH STABILITY: PHYSICAL HEALTH: ON AVERAGE, HOW MANY MINUTES DO YOU ENGAGE IN EXERCISE AT THIS LEVEL?: 60 MIN

## 2025-07-21 ASSESSMENT — ACTIVITIES OF DAILY LIVING (ADL)
TAKING MEDICATION: INDEPENDENT
DRESSING YOURSELF: INDEPENDENT
TOILETING: INDEPENDENT
GROCERY SHOPPING: INDEPENDENT
GROOMING: INDEPENDENT
EATING: INDEPENDENT
JUDGMENT_ADEQUATE_SAFELY_COMPLETE_DAILY_ACTIVITIES: YES
BATHING: INDEPENDENT
PILL BOX USED: NO
FEEDING YOURSELF: INDEPENDENT
DOING HOUSEWORK: INDEPENDENT
STIL DRIVING: YES
HEARING - RIGHT EAR: HEARING AID
MANAGING FINANCES: INDEPENDENT
USING TELEPHONE: INDEPENDENT
WALKS IN HOME: INDEPENDENT
ADEQUATE_TO_COMPLETE_ADL: YES
HEARING - LEFT EAR: HEARING AID
USING TRANSPORTATION: INDEPENDENT
PATIENT'S MEMORY ADEQUATE TO SAFELY COMPLETE DAILY ACTIVITIES?: YES
NEEDS ASSISTANCE WITH FOOD: INDEPENDENT
PREPARING MEALS: INDEPENDENT
ASSISTIVE_DEVICE: EYEGLASSES;HEARING AID - LEFT;HEARING AID - RIGHT

## 2025-07-21 ASSESSMENT — SOCIAL DETERMINANTS OF HEALTH (SDOH)
WITHIN THE LAST YEAR, HAVE YOU BEEN HUMILIATED OR EMOTIONALLY ABUSED IN OTHER WAYS BY YOUR PARTNER OR EX-PARTNER?: NO
HOW OFTEN DO YOU ATTEND CHURCH OR RELIGIOUS SERVICES?: NEVER
DO YOU BELONG TO ANY CLUBS OR ORGANIZATIONS SUCH AS CHURCH GROUPS UNIONS, FRATERNAL OR ATHLETIC GROUPS, OR SCHOOL GROUPS?: NO
HOW OFTEN DO YOU GET TOGETHER WITH FRIENDS OR RELATIVES?: MORE THAN THREE TIMES A WEEK
IN THE PAST 12 MONTHS, HAS THE ELECTRIC, GAS, OIL, OR WATER COMPANY THREATENED TO SHUT OFF SERVICE IN YOUR HOME?: NO
WITHIN THE LAST YEAR, HAVE TO BEEN RAPED OR FORCED TO HAVE ANY KIND OF SEXUAL ACTIVITY BY YOUR PARTNER OR EX-PARTNER?: NO
WITHIN THE LAST YEAR, HAVE YOU BEEN AFRAID OF YOUR PARTNER OR EX-PARTNER?: NO
HOW OFTEN DO YOU ATTENT MEETINGS OF THE CLUB OR ORGANIZATION YOU BELONG TO?: NEVER
IN A TYPICAL WEEK, HOW MANY TIMES DO YOU TALK ON THE PHONE WITH FAMILY, FRIENDS, OR NEIGHBORS?: MORE THAN THREE TIMES A WEEK
HOW HARD IS IT FOR YOU TO PAY FOR THE VERY BASICS LIKE FOOD, HOUSING, MEDICAL CARE, AND HEATING?: NOT HARD AT ALL
WITHIN THE LAST YEAR, HAVE YOU BEEN KICKED, HIT, SLAPPED, OR OTHERWISE PHYSICALLY HURT BY YOUR PARTNER OR EX-PARTNER?: NO

## 2025-07-21 ASSESSMENT — ANXIETY QUESTIONNAIRES
4. TROUBLE RELAXING: NOT AT ALL
1. FEELING NERVOUS, ANXIOUS, OR ON EDGE: NOT AT ALL
2. NOT BEING ABLE TO STOP OR CONTROL WORRYING: NOT AT ALL
5. BEING SO RESTLESS THAT IT IS HARD TO SIT STILL: NEARLY EVERY DAY
GAD7 TOTAL SCORE: 3
3. WORRYING TOO MUCH ABOUT DIFFERENT THINGS: NOT AT ALL
IF YOU CHECKED OFF ANY PROBLEMS ON THIS QUESTIONNAIRE, HOW DIFFICULT HAVE THESE PROBLEMS MADE IT FOR YOU TO DO YOUR WORK, TAKE CARE OF THINGS AT HOME, OR GET ALONG WITH OTHER PEOPLE: NOT DIFFICULT AT ALL
7. FEELING AFRAID AS IF SOMETHING AWFUL MIGHT HAPPEN: NOT AT ALL
6. BECOMING EASILY ANNOYED OR IRRITABLE: NOT AT ALL

## 2025-07-21 ASSESSMENT — PATIENT HEALTH QUESTIONNAIRE - PHQ9
1. LITTLE INTEREST OR PLEASURE IN DOING THINGS: NOT AT ALL
SUM OF ALL RESPONSES TO PHQ9 QUESTIONS 1 AND 2: 0
2. FEELING DOWN, DEPRESSED OR HOPELESS: NOT AT ALL

## 2025-07-21 ASSESSMENT — LIFESTYLE VARIABLES
HOW OFTEN DO YOU HAVE A DRINK CONTAINING ALCOHOL: NEVER
AUDIT-C TOTAL SCORE: 0
SKIP TO QUESTIONS 9-10: 1
HOW OFTEN DO YOU HAVE SIX OR MORE DRINKS ON ONE OCCASION: NEVER
HOW MANY STANDARD DRINKS CONTAINING ALCOHOL DO YOU HAVE ON A TYPICAL DAY: PATIENT DOES NOT DRINK

## 2025-07-21 ASSESSMENT — COLUMBIA-SUICIDE SEVERITY RATING SCALE - C-SSRS
2. HAVE YOU ACTUALLY HAD ANY THOUGHTS OF KILLING YOURSELF?: NO
1. IN THE PAST MONTH, HAVE YOU WISHED YOU WERE DEAD OR WISHED YOU COULD GO TO SLEEP AND NOT WAKE UP?: NO
6. HAVE YOU EVER DONE ANYTHING, STARTED TO DO ANYTHING, OR PREPARED TO DO ANYTHING TO END YOUR LIFE?: NO

## 2025-07-21 ASSESSMENT — ENCOUNTER SYMPTOMS
LOSS OF SENSATION IN FEET: 0
DEPRESSION: 0
OCCASIONAL FEELINGS OF UNSTEADINESS: 0

## 2025-07-21 ASSESSMENT — PAIN SCALES - GENERAL: PAINLEVEL_OUTOF10: 0-NO PAIN

## 2025-07-21 NOTE — PATIENT INSTRUCTIONS
Vaccines recommended:    Prevnar 20, can get any time, this is not seasonal. This is a bacterial pneumonia vaccine.    Flu shot October.  Consider covid vaccine. By September or October unless uptick now.  Consider RSV vaccine (one time only). August or September    Referrals: Neuropsychology testing. Reception to help you schedule.    Schedule CT urography to check on kidney cyst, do this in the next 3 weeks. This should be able to be done at our building.    Consider adding ensure or boost one per day.    Restart pravastatin but at the lower dose we discussed, 20 mg daily.  Follow up in 4 months.  Fasting blood test the week before that visit to see how pravastatin 20 mg is working.    Blood pressure was better on recheck but would like your daughter to confirm that your cuff is accurate at home.  We discussed trying calm aid one per day or one per day as needed for anxiety.            Ways to Help Prevent Falls at Home    Quick Tips   · Ask for help if you need it. Most people want to help!   · Get up slowly after sitting or laying down   · Wear a medical alert device or keep cell phone in your pocket   · Use night lights, especially areas near a bathroom   · Keep the items you use often within reach on a small stool or end table   · Use an assistive device such as walker or cane, as directed by provider/physical therapy   · Use a non-slip mat and grab bars in your bathroom. Look for home health sections for best options     Other Areas to Focus On   · Exercise and nutrition: Regular exercise or taking a falls prevention class are great ways improve strength and balance. Don't forget to stay hydrated and bring a snack!   · Medicine side effects: Some medicines can make you sleepy or dizzy, which could cause a fall. Ask your healthcare provider about the side effects your medicines could cause. Be sure to let them know if you take any vitamins or supplements as well.   · Tripping hazards: Remove items you could  trip on, such as loose mats, rugs, cords, and clutter. Wear closed toe shoes with rubber soles.   · Health and wellness: Get regular checkups with your healthcare provider, plus routine vision and hearing screenings. Talk with your healthcare provider about:   o Your medicines and the possible side effects - bring them in a bag if that is easier!   o Problems with balance or feeling dizzy   o Ways to promote bone health, such as Vitamin D and calcium supplements   o Questions or concerns about falling     *Ask your healthcare team if you have questions     ©[facility], 2022

## 2025-08-04 ENCOUNTER — HOSPITAL ENCOUNTER (OUTPATIENT)
Dept: RADIOLOGY | Facility: CLINIC | Age: 85
Discharge: HOME | End: 2025-08-04
Payer: MEDICARE

## 2025-08-04 DIAGNOSIS — N28.1 RENAL CYST: ICD-10-CM

## 2025-08-04 PROCEDURE — 76376 3D RENDER W/INTRP POSTPROCES: CPT | Performed by: RADIOLOGY

## 2025-08-04 PROCEDURE — 2550000001 HC RX 255 CONTRASTS: Performed by: INTERNAL MEDICINE

## 2025-08-04 PROCEDURE — 74178 CT ABD&PLV WO CNTR FLWD CNTR: CPT

## 2025-08-04 PROCEDURE — 74178 CT ABD&PLV WO CNTR FLWD CNTR: CPT | Performed by: RADIOLOGY

## 2025-08-04 RX ADMIN — IOHEXOL 90 ML: 350 INJECTION, SOLUTION INTRAVENOUS at 09:15

## 2025-11-12 ENCOUNTER — APPOINTMENT (OUTPATIENT)
Dept: RADIOLOGY | Facility: CLINIC | Age: 85
End: 2025-11-12
Payer: COMMERCIAL

## 2025-11-19 ENCOUNTER — APPOINTMENT (OUTPATIENT)
Dept: PRIMARY CARE | Facility: CLINIC | Age: 85
End: 2025-11-19
Payer: COMMERCIAL

## 2026-02-04 ENCOUNTER — APPOINTMENT (OUTPATIENT)
Dept: NEUROLOGY | Facility: CLINIC | Age: 86
End: 2026-02-04
Payer: COMMERCIAL